# Patient Record
Sex: FEMALE | Race: WHITE | URBAN - NONMETROPOLITAN AREA
[De-identification: names, ages, dates, MRNs, and addresses within clinical notes are randomized per-mention and may not be internally consistent; named-entity substitution may affect disease eponyms.]

---

## 2018-09-14 NOTE — PROGRESS NOTES
MSG LEFT FOR Peggyann Tereza AT [facility], FOR MISSING SURGICAL CHART INFO. NEED LABS, CXR, EKG, ORDERS, AND CLEARANCE.

## 2018-09-15 NOTE — H&P
application of Blue-Emu cream.  All of these have been somewhat  helpful. 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 She does have the diagnosis of cerebral palsy which does at  this time affect her balance and coordination. She uses a cane walking. She uses wheelchair for longer distances due to the way the symptoms have  affected her mobility. She denies any change in urine or stool control at  this time. ALLERGIES:  No known drug allergies. PAST MEDICAL HISTORY:  Includes bronchitis. MEDICATIONS:  Include Flexeril, estradiol, levonorgestrel,  cyclobenzaprine. The patient has stopped vitamins. She is also taking  Tylenol. PAST SURGICAL HISTORY:  Include fallopian tube which was removed in 1999. HOSPITALIZATION:  Pneumonia in 1980 and in [address] [facility]. SOCIAL HISTORY:  Include nonsmoker, currently living with parents at a  private home, working part-time, and currently single. REVIEW OF SYSTEMS:  Include back pain, lower leg radiculopathy. PHYSICAL EXAMINATION:  GENERAL:  The patient is a pleasant female, well developed, well nourished,  and cooperative. She is alert and oriented x3 and currently having  symptoms of pain in the back and in the lower extremities. VITAL SIGNS:  Include blood pressure is 131/89, pulse is 88, BMI is 28.27,  weight is 140 pounds, height is 4 feet 11 inches tall. HEART:  Rate and rhythm are regular. LUNGS:  Clear to auscultation bilaterally. ABDOMEN:  Soft and nontender. EXTREMITIES:  Examination of the bilateral lower extremities demonstrates  no obvious deformities, no open wounds or skin lesion. Pulses are 2+  bilaterally. Calves are soft and nontender. There is no clinical evidence  of DVT. Lower extremities strength exam demonstrates 5/5 strength with  resisted knee extension, plantar flexion, ankle dorsiflexion, and great toe  extension bilaterally.   The patient is sensate throughout the light touch  bilaterally. DIAGNOSTIC DATA:  X-ray imaging, dictated by Dr. Eli Stokes, shows an AP view  with no evidence of lumbar spine scoliosis. On the lateral view, there is  a grade 1-2 isthmic spondylolisthesis of the L4-L5. This listhesis is made  worse in flexion, does not reduce in extension and there is a 25%  anterolisthesis at L4-L5. MRI imaging, dictated by Dr. Eli Stokes, of the lumbar spine shows a chronic  pars defect of the L4 level with a grade 1 anterolisthesis, which is an  isthmic spondylolisthesis of L4-L5. There is also central disk extrusion  and severe narrowing of the canal at the L4-L5 level with a superimposed  facet joint cyst, also neurocompression on the right hand side of the L4-L5  level. ASSESSMENT:  1. Spondylolisthesis of lumbar region. 2.  Low back pain. 3.  Lumbar stenosis with neurogenic claudication. MRSA by PCR is negative. PLAN:  The patient will be undergoing lumbar laminectomy with posterior  surgical fusion and posterior lumbar interbody fusion of L4-L5, local bone  grafting versus ICBG and the patient will be undergoing the surgical  management at [address] [place] [address] on 09/17/2018, and the patient has  been cleared by Dr. Mayela Loera and the patient desired to undergo  surgical management at this time. CONCLUSION:  The patient is having symptoms of back pain to lower extremity  that is worse with ambulation and the patient has failed conservative  management of prednisone, heating pad, naproxen, Tylenol, Flexeril, and the  patient desires surgical management at this time. Dr. Torsten Ayon saw the patient in the office and did explain the surgical  procedure to the patient and the patient understands THE surgical procedure  and the patient is in agreement to undergo surgical procedure at this time.   Also, Dr. Eli Stokes had also spoken to the patient previously and answered all  the questions that the patient had and also did explain the surgical  procedure to the patient which the patient is in agreement to undergo  surgical management at this time. Surgical testing and clearance were  obtained from Dr. Paty Miller and those presurgical testing exams were  reviewed with the patient in the office and the patient is cleared to  undergo surgical management at this time. Informed consent was also  discussed with the patient, which includes risks and benefits of surgery  such as paralysis, hematoma formation, postoperative pain, postoperative  infection, bleeding, dural tear, and spinal fluid leak. All the questions  that the patient had were answered.         MELLISA Zurita MD    D: 09/14/2018 10:48:54       T: 09/14/2018 13:24:49     [email or web address]  Job#: [id number]     Doc#: [id number]    CC:  Primary Care Physician

## 2018-09-17 ENCOUNTER — ANESTHESIA EVENT (OUTPATIENT)
Dept: OPERATING ROOM | Age: 38
DRG: 304 | End: 2018-09-17
Payer: MEDICAID

## 2018-09-17 ENCOUNTER — HOSPITAL ENCOUNTER (INPATIENT)
Age: 38
LOS: 5 days | Discharge: HOME HEALTH | DRG: 304 | End: 2018-09-22
Attending: ORTHOPAEDIC SURGERY | Admitting: ORTHOPAEDIC SURGERY
Payer: MEDICAID

## 2018-09-17 ENCOUNTER — APPOINTMENT (OUTPATIENT)
Dept: GENERAL RADIOLOGY | Age: 38
DRG: 304 | End: 2018-09-17
Attending: ORTHOPAEDIC SURGERY
Payer: MEDICAID

## 2018-09-17 ENCOUNTER — ANESTHESIA (OUTPATIENT)
Dept: OPERATING ROOM | Age: 38
DRG: 304 | End: 2018-09-17
Payer: MEDICAID

## 2018-09-17 VITALS
DIASTOLIC BLOOD PRESSURE: 61 MMHG | RESPIRATION RATE: 17 BRPM | OXYGEN SATURATION: 100 % | SYSTOLIC BLOOD PRESSURE: 98 MMHG | TEMPERATURE: 98.2 F

## 2018-09-17 DIAGNOSIS — M43.16 SPONDYLOLISTHESIS OF LUMBAR REGION: Primary | ICD-10-CM

## 2018-09-17 PROBLEM — R00.0 TACHYCARDIA, UNSPECIFIED: Status: ACTIVE | Noted: 2018-09-17

## 2018-09-17 LAB
ABO: NORMAL
ANION GAP SERPL CALCULATED.3IONS-SCNC: 18 MEQ/L (ref 8–16)
ANTIBODY SCREEN: NORMAL
BUN BLDV-MCNC: 8 MG/DL (ref 7–22)
CALCIUM SERPL-MCNC: 8.2 MG/DL (ref 8.5–10.5)
CHLORIDE BLD-SCNC: 100 MEQ/L (ref 98–111)
CO2: 17 MEQ/L (ref 23–33)
CREAT SERPL-MCNC: 0.5 MG/DL (ref 0.4–1.2)
EKG ATRIAL RATE: 129 BPM
EKG P AXIS: 28 DEGREES
EKG P-R INTERVAL: 122 MS
EKG Q-T INTERVAL: 304 MS
EKG QRS DURATION: 70 MS
EKG QTC CALCULATION (BAZETT): 445 MS
EKG R AXIS: 19 DEGREES
EKG T AXIS: 21 DEGREES
EKG VENTRICULAR RATE: 129 BPM
ERYTHROCYTE [DISTWIDTH] IN BLOOD BY AUTOMATED COUNT: 12.5 % (ref 11.5–14.5)
ERYTHROCYTE [DISTWIDTH] IN BLOOD BY AUTOMATED COUNT: 45.2 FL (ref 35–45)
GFR SERPL CREATININE-BSD FRML MDRD: > 90 ML/MIN/1.73M2
GLUCOSE BLD-MCNC: 213 MG/DL (ref 70–108)
HCT VFR BLD CALC: 38.1 % (ref 37–47)
HEMOGLOBIN: 12.7 GM/DL (ref 12–16)
MCH RBC QN AUTO: 33 PG (ref 26–33)
MCHC RBC AUTO-ENTMCNC: 33.3 GM/DL (ref 32.2–35.5)
MCV RBC AUTO: 99 FL (ref 81–99)
PLATELET # BLD: 291 THOU/MM3 (ref 130–400)
PMV BLD AUTO: 11.9 FL (ref 9.4–12.4)
POTASSIUM SERPL-SCNC: 4.3 MEQ/L (ref 3.5–5.2)
RBC # BLD: 3.85 MILL/MM3 (ref 4.2–5.4)
RH FACTOR: NORMAL
SODIUM BLD-SCNC: 135 MEQ/L (ref 135–145)
WBC # BLD: 18.3 THOU/MM3 (ref 4.8–10.8)

## 2018-09-17 PROCEDURE — 86900 BLOOD TYPING SEROLOGIC ABO: CPT

## 2018-09-17 PROCEDURE — 2580000003 HC RX 258: Performed by: PHYSICIAN ASSISTANT

## 2018-09-17 PROCEDURE — 2580000003 HC RX 258: Performed by: ORTHOPAEDIC SURGERY

## 2018-09-17 PROCEDURE — 3600000015 HC SURGERY LEVEL 5 ADDTL 15MIN: Performed by: ORTHOPAEDIC SURGERY

## 2018-09-17 PROCEDURE — 6360000002 HC RX W HCPCS: Performed by: PHYSICIAN ASSISTANT

## 2018-09-17 PROCEDURE — 72020 X-RAY EXAM OF SPINE 1 VIEW: CPT

## 2018-09-17 PROCEDURE — 7100000000 HC PACU RECOVERY - FIRST 15 MIN: Performed by: ORTHOPAEDIC SURGERY

## 2018-09-17 PROCEDURE — 2700000000 HC OXYGEN THERAPY PER DAY

## 2018-09-17 PROCEDURE — 80048 BASIC METABOLIC PNL TOTAL CA: CPT

## 2018-09-17 PROCEDURE — 86901 BLOOD TYPING SEROLOGIC RH(D): CPT

## 2018-09-17 PROCEDURE — 36415 COLL VENOUS BLD VENIPUNCTURE: CPT

## 2018-09-17 PROCEDURE — 95940 IONM IN OPERATNG ROOM 15 MIN: CPT | Performed by: ORTHOPAEDIC SURGERY

## 2018-09-17 PROCEDURE — 3700000001 HC ADD 15 MINUTES (ANESTHESIA): Performed by: ORTHOPAEDIC SURGERY

## 2018-09-17 PROCEDURE — 1200000000 HC SEMI PRIVATE

## 2018-09-17 PROCEDURE — 2500000003 HC RX 250 WO HCPCS: Performed by: NURSE ANESTHETIST, CERTIFIED REGISTERED

## 2018-09-17 PROCEDURE — 2500000003 HC RX 250 WO HCPCS: Performed by: ORTHOPAEDIC SURGERY

## 2018-09-17 PROCEDURE — 0SG3071 FUSION OF LUMBOSACRAL JOINT WITH AUTOLOGOUS TISSUE SUBSTITUTE, POSTERIOR APPROACH, POSTERIOR COLUMN, OPEN APPROACH: ICD-10-PCS | Performed by: ORTHOPAEDIC SURGERY

## 2018-09-17 PROCEDURE — 6370000000 HC RX 637 (ALT 250 FOR IP): Performed by: ORTHOPAEDIC SURGERY

## 2018-09-17 PROCEDURE — 2580000003 HC RX 258: Performed by: NURSE ANESTHETIST, CERTIFIED REGISTERED

## 2018-09-17 PROCEDURE — 99253 IP/OBS CNSLTJ NEW/EST LOW 45: CPT | Performed by: INTERNAL MEDICINE

## 2018-09-17 PROCEDURE — 94761 N-INVAS EAR/PLS OXIMETRY MLT: CPT

## 2018-09-17 PROCEDURE — 85027 COMPLETE CBC AUTOMATED: CPT

## 2018-09-17 PROCEDURE — 86850 RBC ANTIBODY SCREEN: CPT

## 2018-09-17 PROCEDURE — 2780000010 HC IMPLANT OTHER: Performed by: ORTHOPAEDIC SURGERY

## 2018-09-17 PROCEDURE — 0SB20ZZ EXCISION OF LUMBAR VERTEBRAL DISC, OPEN APPROACH: ICD-10-PCS | Performed by: ORTHOPAEDIC SURGERY

## 2018-09-17 PROCEDURE — 0SG00AJ FUSION OF LUMBAR VERTEBRAL JOINT WITH INTERBODY FUSION DEVICE, POSTERIOR APPROACH, ANTERIOR COLUMN, OPEN APPROACH: ICD-10-PCS | Performed by: ORTHOPAEDIC SURGERY

## 2018-09-17 PROCEDURE — 3700000000 HC ANESTHESIA ATTENDED CARE: Performed by: ORTHOPAEDIC SURGERY

## 2018-09-17 PROCEDURE — 2709999900 HC NON-CHARGEABLE SUPPLY: Performed by: ORTHOPAEDIC SURGERY

## 2018-09-17 PROCEDURE — 72100 X-RAY EXAM L-S SPINE 2/3 VWS: CPT

## 2018-09-17 PROCEDURE — 3600000005 HC SURGERY LEVEL 5 BASE: Performed by: ORTHOPAEDIC SURGERY

## 2018-09-17 PROCEDURE — 2720000010 HC SURG SUPPLY STERILE: Performed by: ORTHOPAEDIC SURGERY

## 2018-09-17 PROCEDURE — 6360000002 HC RX W HCPCS: Performed by: ANESTHESIOLOGY

## 2018-09-17 PROCEDURE — C1713 ANCHOR/SCREW BN/BN,TIS/BN: HCPCS | Performed by: ORTHOPAEDIC SURGERY

## 2018-09-17 PROCEDURE — 7100000001 HC PACU RECOVERY - ADDTL 15 MIN: Performed by: ORTHOPAEDIC SURGERY

## 2018-09-17 PROCEDURE — 6360000002 HC RX W HCPCS: Performed by: ORTHOPAEDIC SURGERY

## 2018-09-17 PROCEDURE — 93005 ELECTROCARDIOGRAM TRACING: CPT | Performed by: INTERNAL MEDICINE

## 2018-09-17 PROCEDURE — 86923 COMPATIBILITY TEST ELECTRIC: CPT

## 2018-09-17 PROCEDURE — 2580000003 HC RX 258: Performed by: INTERNAL MEDICINE

## 2018-09-17 PROCEDURE — 6360000002 HC RX W HCPCS: Performed by: NURSE ANESTHETIST, CERTIFIED REGISTERED

## 2018-09-17 DEVICE — IMPLANTABLE DEVICE
Type: IMPLANTABLE DEVICE | Site: SPINE LUMBAR | Status: FUNCTIONAL
Brand: SET SCREW

## 2018-09-17 DEVICE — ALLOGRAFT STRP DBM PLF GRFT 2.5CM X 5CM: Type: IMPLANTABLE DEVICE | Site: SPINE LUMBAR | Status: FUNCTIONAL

## 2018-09-17 DEVICE — POLYAXIAL SCREW, 6.5 X 45
Type: IMPLANTABLE DEVICE | Site: SPINE LUMBAR | Status: FUNCTIONAL
Brand: POLYAXIAL SCREW, 6.5 X 45

## 2018-09-17 DEVICE — IMPLANTABLE DEVICE: Type: IMPLANTABLE DEVICE | Site: SPINE LUMBAR | Status: FUNCTIONAL

## 2018-09-17 DEVICE — CURVED ROD, 5.5 X 45
Type: IMPLANTABLE DEVICE | Site: SPINE LUMBAR | Status: FUNCTIONAL
Brand: CURVED ROD, 5.5 X 45

## 2018-09-17 RX ORDER — NEOSTIGMINE METHYLSULFATE 1 MG/ML
INJECTION, SOLUTION INTRAVENOUS PRN
Status: DISCONTINUED | OUTPATIENT
Start: 2018-09-17 | End: 2018-09-17 | Stop reason: SDUPTHER

## 2018-09-17 RX ORDER — ROCURONIUM BROMIDE 10 MG/ML
INJECTION, SOLUTION INTRAVENOUS PRN
Status: DISCONTINUED | OUTPATIENT
Start: 2018-09-17 | End: 2018-09-17 | Stop reason: SDUPTHER

## 2018-09-17 RX ORDER — LEVONORGESTREL AND ETHINYL ESTRADIOL 100-20(84)
1 KIT ORAL DAILY
Status: DISCONTINUED | OUTPATIENT
Start: 2018-09-17 | End: 2018-09-22 | Stop reason: HOSPADM

## 2018-09-17 RX ORDER — DEXAMETHASONE SODIUM PHOSPHATE 4 MG/ML
INJECTION, SOLUTION INTRA-ARTICULAR; INTRALESIONAL; INTRAMUSCULAR; INTRAVENOUS; SOFT TISSUE PRN
Status: DISCONTINUED | OUTPATIENT
Start: 2018-09-17 | End: 2018-09-17 | Stop reason: SDUPTHER

## 2018-09-17 RX ORDER — ASCORBIC ACID 500 MG
500 TABLET ORAL DAILY
Status: DISCONTINUED | OUTPATIENT
Start: 2018-09-17 | End: 2018-09-22 | Stop reason: HOSPADM

## 2018-09-17 RX ORDER — ASCORBIC ACID 500 MG
500 TABLET ORAL DAILY
COMMUNITY

## 2018-09-17 RX ORDER — PROPOFOL 10 MG/ML
INJECTION, EMULSION INTRAVENOUS PRN
Status: DISCONTINUED | OUTPATIENT
Start: 2018-09-17 | End: 2018-09-17 | Stop reason: SDUPTHER

## 2018-09-17 RX ORDER — CYCLOBENZAPRINE HCL 10 MG
10 TABLET ORAL 3 TIMES DAILY PRN
Status: DISCONTINUED | OUTPATIENT
Start: 2018-09-17 | End: 2018-09-22 | Stop reason: HOSPADM

## 2018-09-17 RX ORDER — FENTANYL CITRATE 50 UG/ML
INJECTION, SOLUTION INTRAMUSCULAR; INTRAVENOUS PRN
Status: DISCONTINUED | OUTPATIENT
Start: 2018-09-17 | End: 2018-09-17 | Stop reason: SDUPTHER

## 2018-09-17 RX ORDER — M-VIT,TX,IRON,MINS/CALC/FOLIC 27MG-0.4MG
1 TABLET ORAL DAILY
COMMUNITY

## 2018-09-17 RX ORDER — LEVONORGESTREL AND ETHINYL ESTRADIOL 100-20(84)
1 KIT ORAL DAILY
COMMUNITY

## 2018-09-17 RX ORDER — HYDROMORPHONE HCL 110MG/55ML
PATIENT CONTROLLED ANALGESIA SYRINGE INTRAVENOUS PRN
Status: DISCONTINUED | OUTPATIENT
Start: 2018-09-17 | End: 2018-09-17 | Stop reason: SDUPTHER

## 2018-09-17 RX ORDER — 0.9 % SODIUM CHLORIDE 0.9 %
250 INTRAVENOUS SOLUTION INTRAVENOUS ONCE
Status: DISCONTINUED | OUTPATIENT
Start: 2018-09-17 | End: 2018-09-17

## 2018-09-17 RX ORDER — 0.9 % SODIUM CHLORIDE 0.9 %
500 INTRAVENOUS SOLUTION INTRAVENOUS ONCE
Status: COMPLETED | OUTPATIENT
Start: 2018-09-17 | End: 2018-09-17

## 2018-09-17 RX ORDER — CYCLOBENZAPRINE HCL 10 MG
10 TABLET ORAL 2 TIMES DAILY
Status: ON HOLD | COMMUNITY
End: 2018-09-22

## 2018-09-17 RX ORDER — GLYCOPYRROLATE 1 MG/5 ML
SYRINGE (ML) INTRAVENOUS PRN
Status: DISCONTINUED | OUTPATIENT
Start: 2018-09-17 | End: 2018-09-17 | Stop reason: SDUPTHER

## 2018-09-17 RX ORDER — M-VIT,TX,IRON,MINS/CALC/FOLIC 27MG-0.4MG
1 TABLET ORAL DAILY
Status: DISCONTINUED | OUTPATIENT
Start: 2018-09-17 | End: 2018-09-22 | Stop reason: HOSPADM

## 2018-09-17 RX ORDER — LABETALOL HYDROCHLORIDE 5 MG/ML
5 INJECTION, SOLUTION INTRAVENOUS EVERY 10 MIN PRN
Status: DISCONTINUED | OUTPATIENT
Start: 2018-09-17 | End: 2018-09-17

## 2018-09-17 RX ORDER — FENTANYL CITRATE 50 UG/ML
50 INJECTION, SOLUTION INTRAMUSCULAR; INTRAVENOUS EVERY 5 MIN PRN
Status: DISCONTINUED | OUTPATIENT
Start: 2018-09-17 | End: 2018-09-17

## 2018-09-17 RX ORDER — ONDANSETRON 2 MG/ML
4 INJECTION INTRAMUSCULAR; INTRAVENOUS EVERY 6 HOURS PRN
Status: DISCONTINUED | OUTPATIENT
Start: 2018-09-17 | End: 2018-09-22 | Stop reason: HOSPADM

## 2018-09-17 RX ORDER — SODIUM CHLORIDE, SODIUM LACTATE, POTASSIUM CHLORIDE, CALCIUM CHLORIDE 600; 310; 30; 20 MG/100ML; MG/100ML; MG/100ML; MG/100ML
INJECTION, SOLUTION INTRAVENOUS CONTINUOUS PRN
Status: DISCONTINUED | OUTPATIENT
Start: 2018-09-17 | End: 2018-09-17 | Stop reason: SDUPTHER

## 2018-09-17 RX ORDER — ACETAMINOPHEN 325 MG/1
650 TABLET ORAL EVERY 4 HOURS PRN
Status: DISCONTINUED | OUTPATIENT
Start: 2018-09-17 | End: 2018-09-22 | Stop reason: HOSPADM

## 2018-09-17 RX ORDER — OXYCODONE HYDROCHLORIDE AND ACETAMINOPHEN 5; 325 MG/1; MG/1
1 TABLET ORAL EVERY 4 HOURS PRN
Status: DISCONTINUED | OUTPATIENT
Start: 2018-09-17 | End: 2018-09-21

## 2018-09-17 RX ORDER — ONDANSETRON 2 MG/ML
4 INJECTION INTRAMUSCULAR; INTRAVENOUS
Status: DISCONTINUED | OUTPATIENT
Start: 2018-09-17 | End: 2018-09-17

## 2018-09-17 RX ORDER — LIDOCAINE HYDROCHLORIDE 20 MG/ML
INJECTION, SOLUTION INFILTRATION; PERINEURAL PRN
Status: DISCONTINUED | OUTPATIENT
Start: 2018-09-17 | End: 2018-09-17 | Stop reason: SDUPTHER

## 2018-09-17 RX ORDER — OXYCODONE HYDROCHLORIDE AND ACETAMINOPHEN 5; 325 MG/1; MG/1
2 TABLET ORAL EVERY 4 HOURS PRN
Status: DISCONTINUED | OUTPATIENT
Start: 2018-09-17 | End: 2018-09-21

## 2018-09-17 RX ORDER — SODIUM CHLORIDE 9 MG/ML
INJECTION, SOLUTION INTRAVENOUS CONTINUOUS PRN
Status: DISCONTINUED | OUTPATIENT
Start: 2018-09-17 | End: 2018-09-17 | Stop reason: SDUPTHER

## 2018-09-17 RX ORDER — SODIUM CHLORIDE 0.9 % (FLUSH) 0.9 %
10 SYRINGE (ML) INJECTION PRN
Status: DISCONTINUED | OUTPATIENT
Start: 2018-09-17 | End: 2018-09-22 | Stop reason: HOSPADM

## 2018-09-17 RX ORDER — SODIUM CHLORIDE 9 MG/ML
INJECTION, SOLUTION INTRAVENOUS CONTINUOUS
Status: DISCONTINUED | OUTPATIENT
Start: 2018-09-17 | End: 2018-09-20

## 2018-09-17 RX ORDER — SODIUM CHLORIDE 9 MG/ML
INJECTION, SOLUTION INTRAVENOUS CONTINUOUS
Status: DISCONTINUED | OUTPATIENT
Start: 2018-09-17 | End: 2018-09-17

## 2018-09-17 RX ORDER — DOCUSATE SODIUM 100 MG/1
100 CAPSULE, LIQUID FILLED ORAL 2 TIMES DAILY
Status: DISCONTINUED | OUTPATIENT
Start: 2018-09-17 | End: 2018-09-22 | Stop reason: HOSPADM

## 2018-09-17 RX ORDER — HYDRALAZINE HYDROCHLORIDE 20 MG/ML
5 INJECTION INTRAMUSCULAR; INTRAVENOUS EVERY 10 MIN PRN
Status: DISCONTINUED | OUTPATIENT
Start: 2018-09-17 | End: 2018-09-17

## 2018-09-17 RX ORDER — FENTANYL CITRATE 50 UG/ML
25 INJECTION, SOLUTION INTRAMUSCULAR; INTRAVENOUS EVERY 5 MIN PRN
Status: DISCONTINUED | OUTPATIENT
Start: 2018-09-17 | End: 2018-09-17

## 2018-09-17 RX ORDER — SENNOSIDES 8.6 MG
650 CAPSULE ORAL EVERY 8 HOURS PRN
COMMUNITY

## 2018-09-17 RX ORDER — LIDOCAINE HYDROCHLORIDE AND EPINEPHRINE 10; 10 MG/ML; UG/ML
INJECTION, SOLUTION INFILTRATION; PERINEURAL PRN
Status: DISCONTINUED | OUTPATIENT
Start: 2018-09-17 | End: 2018-09-17 | Stop reason: HOSPADM

## 2018-09-17 RX ORDER — MIDAZOLAM HYDROCHLORIDE 1 MG/ML
INJECTION INTRAMUSCULAR; INTRAVENOUS PRN
Status: DISCONTINUED | OUTPATIENT
Start: 2018-09-17 | End: 2018-09-17 | Stop reason: SDUPTHER

## 2018-09-17 RX ORDER — ONDANSETRON 2 MG/ML
INJECTION INTRAMUSCULAR; INTRAVENOUS PRN
Status: DISCONTINUED | OUTPATIENT
Start: 2018-09-17 | End: 2018-09-17 | Stop reason: SDUPTHER

## 2018-09-17 RX ORDER — SODIUM CHLORIDE 0.9 % (FLUSH) 0.9 %
10 SYRINGE (ML) INJECTION EVERY 12 HOURS SCHEDULED
Status: DISCONTINUED | OUTPATIENT
Start: 2018-09-17 | End: 2018-09-22 | Stop reason: HOSPADM

## 2018-09-17 RX ORDER — ACETAMINOPHEN 650 MG/1
650 SUPPOSITORY RECTAL EVERY 4 HOURS PRN
Status: DISCONTINUED | OUTPATIENT
Start: 2018-09-17 | End: 2018-09-22 | Stop reason: HOSPADM

## 2018-09-17 RX ORDER — ESMOLOL HYDROCHLORIDE 10 MG/ML
INJECTION INTRAVENOUS PRN
Status: DISCONTINUED | OUTPATIENT
Start: 2018-09-17 | End: 2018-09-17 | Stop reason: SDUPTHER

## 2018-09-17 RX ORDER — VITAMIN E 268 MG
200 CAPSULE ORAL DAILY
COMMUNITY

## 2018-09-17 RX ORDER — OXYCODONE HCL 10 MG/1
10 TABLET, FILM COATED, EXTENDED RELEASE ORAL EVERY 12 HOURS
Status: DISCONTINUED | OUTPATIENT
Start: 2018-09-17 | End: 2018-09-22 | Stop reason: HOSPADM

## 2018-09-17 RX ORDER — SENNOSIDES 8.6 MG
650 CAPSULE ORAL EVERY 8 HOURS PRN
Status: DISCONTINUED | OUTPATIENT
Start: 2018-09-17 | End: 2018-09-17 | Stop reason: SDUPTHER

## 2018-09-17 RX ADMIN — ONDANSETRON 4 MG: 2 INJECTION INTRAMUSCULAR; INTRAVENOUS at 22:38

## 2018-09-17 RX ADMIN — FENTANYL CITRATE 50 MCG: 50 INJECTION INTRAMUSCULAR; INTRAVENOUS at 14:28

## 2018-09-17 RX ADMIN — SODIUM CHLORIDE: 9 INJECTION, SOLUTION INTRAVENOUS at 13:29

## 2018-09-17 RX ADMIN — SODIUM CHLORIDE: 9 INJECTION, SOLUTION INTRAVENOUS at 12:17

## 2018-09-17 RX ADMIN — Medication 2 G: at 13:31

## 2018-09-17 RX ADMIN — MIDAZOLAM HYDROCHLORIDE 2 MG: 1 INJECTION, SOLUTION INTRAMUSCULAR; INTRAVENOUS at 13:29

## 2018-09-17 RX ADMIN — Medication 0.6 MG: at 15:34

## 2018-09-17 RX ADMIN — HYDROMORPHONE HYDROCHLORIDE 0.5 MG: 1 INJECTION, SOLUTION INTRAMUSCULAR; INTRAVENOUS; SUBCUTANEOUS at 16:35

## 2018-09-17 RX ADMIN — SODIUM CHLORIDE 500 ML: 9 INJECTION, SOLUTION INTRAVENOUS at 21:05

## 2018-09-17 RX ADMIN — FENTANYL CITRATE 50 MCG: 50 INJECTION INTRAMUSCULAR; INTRAVENOUS at 16:25

## 2018-09-17 RX ADMIN — ONDANSETRON HYDROCHLORIDE 4 MG: 4 INJECTION, SOLUTION INTRAMUSCULAR; INTRAVENOUS at 15:34

## 2018-09-17 RX ADMIN — HYDROMORPHONE HYDROCHLORIDE 0.5 MG: 2 INJECTION INTRAMUSCULAR; INTRAVENOUS; SUBCUTANEOUS at 16:15

## 2018-09-17 RX ADMIN — Medication 2 G: at 21:31

## 2018-09-17 RX ADMIN — SODIUM CHLORIDE, POTASSIUM CHLORIDE, SODIUM LACTATE AND CALCIUM CHLORIDE: 600; 310; 30; 20 INJECTION, SOLUTION INTRAVENOUS at 14:20

## 2018-09-17 RX ADMIN — ROCURONIUM BROMIDE 10 MG: 10 INJECTION INTRAVENOUS at 14:22

## 2018-09-17 RX ADMIN — ESMOLOL HYDROCHLORIDE 50 MG: 10 INJECTION, SOLUTION INTRAVENOUS at 13:38

## 2018-09-17 RX ADMIN — HYDROMORPHONE HYDROCHLORIDE 0.5 MG: 2 INJECTION INTRAMUSCULAR; INTRAVENOUS; SUBCUTANEOUS at 15:42

## 2018-09-17 RX ADMIN — DEXAMETHASONE SODIUM PHOSPHATE 10 MG: 4 INJECTION, SOLUTION INTRAMUSCULAR; INTRAVENOUS at 13:31

## 2018-09-17 RX ADMIN — HYDROMORPHONE HYDROCHLORIDE 0.5 MG: 1 INJECTION, SOLUTION INTRAMUSCULAR; INTRAVENOUS; SUBCUTANEOUS at 17:55

## 2018-09-17 RX ADMIN — PROPOFOL 150 MG: 10 INJECTION, EMULSION INTRAVENOUS at 13:29

## 2018-09-17 RX ADMIN — FENTANYL CITRATE 100 MCG: 50 INJECTION INTRAMUSCULAR; INTRAVENOUS at 14:06

## 2018-09-17 RX ADMIN — NEOSTIGMINE METHYLSULFATE 3 MG: 1 INJECTION, SOLUTION INTRAVENOUS at 15:34

## 2018-09-17 RX ADMIN — FENTANYL CITRATE 50 MCG: 50 INJECTION INTRAMUSCULAR; INTRAVENOUS at 16:30

## 2018-09-17 RX ADMIN — OXYCODONE HYDROCHLORIDE AND ACETAMINOPHEN 1 TABLET: 5; 325 TABLET ORAL at 22:38

## 2018-09-17 RX ADMIN — HYDROMORPHONE HYDROCHLORIDE 0.5 MG: 1 INJECTION, SOLUTION INTRAMUSCULAR; INTRAVENOUS; SUBCUTANEOUS at 16:40

## 2018-09-17 RX ADMIN — ESMOLOL HYDROCHLORIDE 50 MG: 10 INJECTION, SOLUTION INTRAVENOUS at 14:06

## 2018-09-17 RX ADMIN — PHENYLEPHRINE HYDROCHLORIDE 200 MCG: 10 INJECTION INTRAVENOUS at 13:43

## 2018-09-17 RX ADMIN — LIDOCAINE HYDROCHLORIDE 100 MG: 20 INJECTION, SOLUTION INFILTRATION; PERINEURAL at 13:29

## 2018-09-17 RX ADMIN — PHENYLEPHRINE HYDROCHLORIDE 50 MCG: 10 INJECTION INTRAVENOUS at 15:18

## 2018-09-17 RX ADMIN — ROCURONIUM BROMIDE 50 MG: 10 INJECTION INTRAVENOUS at 13:29

## 2018-09-17 RX ADMIN — FENTANYL CITRATE 100 MCG: 50 INJECTION INTRAMUSCULAR; INTRAVENOUS at 13:29

## 2018-09-17 ASSESSMENT — PULMONARY FUNCTION TESTS
PIF_VALUE: 19
PIF_VALUE: 19
PIF_VALUE: 3
PIF_VALUE: 2
PIF_VALUE: 19
PIF_VALUE: 17
PIF_VALUE: 2
PIF_VALUE: 3
PIF_VALUE: 19
PIF_VALUE: 20
PIF_VALUE: 1
PIF_VALUE: 4
PIF_VALUE: 2
PIF_VALUE: 19
PIF_VALUE: 2
PIF_VALUE: 19
PIF_VALUE: 17
PIF_VALUE: 19
PIF_VALUE: 19
PIF_VALUE: 1
PIF_VALUE: 19
PIF_VALUE: 18
PIF_VALUE: 3
PIF_VALUE: 17
PIF_VALUE: 19
PIF_VALUE: 18
PIF_VALUE: 19
PIF_VALUE: 19
PIF_VALUE: 15
PIF_VALUE: 19
PIF_VALUE: 1
PIF_VALUE: 16
PIF_VALUE: 19
PIF_VALUE: 19
PIF_VALUE: 3
PIF_VALUE: 19
PIF_VALUE: 20
PIF_VALUE: 19
PIF_VALUE: 19
PIF_VALUE: 20
PIF_VALUE: 20
PIF_VALUE: 19
PIF_VALUE: 19
PIF_VALUE: 17
PIF_VALUE: 17
PIF_VALUE: 19
PIF_VALUE: 18
PIF_VALUE: 19
PIF_VALUE: 20
PIF_VALUE: 18
PIF_VALUE: 19
PIF_VALUE: 19
PIF_VALUE: 3
PIF_VALUE: 20
PIF_VALUE: 4
PIF_VALUE: 19
PIF_VALUE: 19
PIF_VALUE: 4
PIF_VALUE: 15
PIF_VALUE: 19
PIF_VALUE: 18
PIF_VALUE: 19
PIF_VALUE: 18
PIF_VALUE: 17
PIF_VALUE: 20
PIF_VALUE: 19
PIF_VALUE: 3
PIF_VALUE: 19
PIF_VALUE: 3
PIF_VALUE: 19
PIF_VALUE: 19
PIF_VALUE: 3
PIF_VALUE: 19
PIF_VALUE: 19
PIF_VALUE: 18
PIF_VALUE: 3
PIF_VALUE: 18
PIF_VALUE: 19
PIF_VALUE: 19
PIF_VALUE: 4
PIF_VALUE: 19
PIF_VALUE: 19
PIF_VALUE: 20
PIF_VALUE: 3
PIF_VALUE: 19
PIF_VALUE: 20
PIF_VALUE: 19
PIF_VALUE: 17
PIF_VALUE: 19
PIF_VALUE: 3
PIF_VALUE: 19
PIF_VALUE: 18
PIF_VALUE: 19
PIF_VALUE: 20
PIF_VALUE: 19
PIF_VALUE: 17
PIF_VALUE: 18
PIF_VALUE: 19
PIF_VALUE: 16
PIF_VALUE: 1
PIF_VALUE: 20
PIF_VALUE: 17
PIF_VALUE: 19
PIF_VALUE: 20
PIF_VALUE: 19
PIF_VALUE: 18
PIF_VALUE: 19
PIF_VALUE: 21
PIF_VALUE: 1
PIF_VALUE: 17
PIF_VALUE: 2
PIF_VALUE: 2
PIF_VALUE: 18
PIF_VALUE: 19
PIF_VALUE: 18
PIF_VALUE: 20
PIF_VALUE: 20
PIF_VALUE: 4
PIF_VALUE: 19
PIF_VALUE: 3
PIF_VALUE: 19
PIF_VALUE: 19
PIF_VALUE: 20
PIF_VALUE: 19
PIF_VALUE: 19
PIF_VALUE: 18
PIF_VALUE: 19
PIF_VALUE: 4
PIF_VALUE: 19
PIF_VALUE: 18
PIF_VALUE: 16
PIF_VALUE: 19
PIF_VALUE: 3
PIF_VALUE: 1
PIF_VALUE: 16
PIF_VALUE: 3
PIF_VALUE: 19
PIF_VALUE: 19
PIF_VALUE: 15
PIF_VALUE: 3
PIF_VALUE: 15
PIF_VALUE: 18
PIF_VALUE: 19
PIF_VALUE: 20
PIF_VALUE: 18
PIF_VALUE: 18
PIF_VALUE: 19
PIF_VALUE: 20
PIF_VALUE: 3
PIF_VALUE: 1
PIF_VALUE: 19
PIF_VALUE: 4
PIF_VALUE: 19
PIF_VALUE: 18
PIF_VALUE: 19

## 2018-09-17 ASSESSMENT — PAIN SCALES - GENERAL
PAINLEVEL_OUTOF10: 10
PAINLEVEL_OUTOF10: 5
PAINLEVEL_OUTOF10: 6
PAINLEVEL_OUTOF10: 10

## 2018-09-17 ASSESSMENT — PAIN DESCRIPTION - LOCATION: LOCATION: BACK

## 2018-09-17 ASSESSMENT — PAIN DESCRIPTION - PAIN TYPE: TYPE: SURGICAL PAIN

## 2018-09-17 ASSESSMENT — PAIN DESCRIPTION - DESCRIPTORS: DESCRIPTORS: BURNING

## 2018-09-17 ASSESSMENT — PAIN - FUNCTIONAL ASSESSMENT: PAIN_FUNCTIONAL_ASSESSMENT: 0-10

## 2018-09-17 NOTE — PROGRESS NOTES
[phone] Pt arouses to name on arrival to PACU , O2 3L NC applied HOB elevated , both arms are very stiff and contracted to the body ,   1625 pt awake and talking , pt encouraged to relax arms , with frankie pressure arms where straitened out , pt wants to curl arms back up pt crying and states it hurts a lot medicated with fentanyl 50 mcg IV , pt crying and states she wants her Mom  1630 continues to say hurts a lot medicated with fentanyl 50 mcg IV   1635 no change with pt medicated with dilaudid 0.5 mg IV   1640 unchanged medicated with dilaudid 0.5 mg IV   1655 eyes closed resp easy   1710 resting resp easy   1715 awakens on own states she wants her Mom and starts to cry  1735 continues to rest and cry on and off , states it hurts but wants to go see her Mom  1740 meets criteria for discharge , transferred to [facility] Namrata Fagan in room waiting

## 2018-09-17 NOTE — BRIEF OP NOTE
Brief Postoperative Note  ______________________________________________________________    Patient: Katherin Welch  YOB: 1980  MRN: [id number]  Date of Procedure: 9/17/2018    Pre-Op Diagnosis: SPONDYLOLISTHESIS OF LUMBAR REGION, PAIN    Post-Op Diagnosis: Same       Procedure(s):  LUMBAR LAMINECTOMY WITH PSF/PLIF L4-5 WITH CAPTIVA,   DBM LOCAL BONE GRAFTING VS ICBG    Anesthesia: General    Surgeon(s):  Apolonia Voss MD    Staff:  First Assistant: Cirilo Benavides MA  Scrub Person First: Sil Parker  Scrub Person Second: Gerber Gonzalez  Physician Assistant: Evelio Herrera PA-C     Estimated Blood Loss: 400 (units unknown) mL    Complications: None    Specimens:   * No specimens in log *    Implants:  * No implants in log *      Drains:   Closed/Suction Drain Posterior Back Accordion 10 Turk[place] (Active)       Urethral Catheter Latex 16 fr (Active)       Findings: same    Apolonia Voss MD  Date: 9/17/2018  Time: 3:38 PM

## 2018-09-17 NOTE — ANESTHESIA PRE PROCEDURE
Department of Anesthesiology  Preprocedure Note       Name:  Hiwot Michelle   Age:  45 y.o.  :  1980                                          MRN:  [id number]         Date:  2018      Surgeon: Fozia Phan):  Jason Gan MD    Procedure: Procedure(s):  LUMBAR LAMINECTOMY WITH PSF/PLIF L4-5 WITH CAPTIVA,   DBM LOCAL BONE GRAFTING VS ICBG    Medications prior to admission:   Prior to Admission medications    Medication Sig Start Date End Date Taking? Authorizing Provider   Multiple Vitamins-Minerals (THERAPEUTIC MULTIVITAMIN-MINERALS) tablet Take 1 tablet by mouth daily   Yes Historical Provider, MD   vitamin C (ASCORBIC ACID) 500 MG tablet Take 500 mg by mouth daily   Yes Historical Provider, MD   vitamin E 400 UNIT capsule Take 200 Units by mouth daily   Yes Historical Provider, MD   Levonorgest-Eth Estrad 91-Day 0.1-0.02 & 0.01 MG TABS Take 1 tablet by mouth daily   Yes Historical Provider, MD   cyclobenzaprine (FLEXERIL) 10 MG tablet Take 10 mg by mouth 2 times daily Mother states patient takes regularly at 0800 and 1600.    Yes Historical Provider, MD   acetaminophen (TYLENOL) 650 MG extended release tablet Take 650 mg by mouth every 8 hours as needed for Pain Arthritis pain reliever   Yes Historical Provider, MD   Liniments (BLUE-EMU SUPER STRENGTH) CREA Apply topically Muscle rub   Yes Historical Provider, MD       Current medications:    Current Facility-Administered Medications   Medication Dose Route Frequency Provider Last Rate Last Dose    0.9 % sodium chloride infusion   Intravenous Continuous Lanre Odell PA-C        ceFAZolin (ANCEF) 2 g in dextrose 5 % 50 mL IVPB  2 g Intravenous On Call to OR Lanre Odell PA-C        0.9 % sodium chloride bolus  250 mL Intravenous Once Lanre Odell PA-C           Allergies:  No Known Allergies    Problem List:    Patient Active Problem List   Diagnosis Code    Spondylolisthesis of lumbar region M43.16       Past Medical History:        Diagnosis Date  History of cerebral palsy        Past Surgical History:        Procedure Laterality Date    OTHER SURGICAL HISTORY      falopean tubes removed  20 yrs ago       Social History:    Social History   Substance Use Topics    Smoking status: Never Smoker    Smokeless tobacco: Never Used    Alcohol use No                                Counseling given: Not Answered      Vital Signs (Current):   Vitals:    09/17/18 1115 09/17/18 1135   BP:  126/87   Pulse:  90   Resp:  16   Temp:  98.9 °F (37.2 °C)   TempSrc:  Temporal   SpO2:  97%   Weight: 140 lb (63.5 kg) 142 lb 3.2 oz (64.5 kg)   Height: 4' 11\" (1.499 m) 4' 11\" (1.499 m)                                              BP Readings from Last 3 Encounters:   09/17/18 126/87       NPO Status: Time of last liquid consumption: 2000                        Time of last solid consumption: 2000                        Date of last liquid consumption: 09/16/18                        Date of last solid food consumption: 09/16/18    BMI:   Wt Readings from Last 3 Encounters:   09/17/18 142 lb 3.2 oz (64.5 kg)   09/11/18 140 lb (63.5 kg)     Body mass index is 28.72 kg/m². CBC: No results found for: WBC, RBC, HGB, HCT, MCV, RDW, PLT    CMP: No results found for: NA, K, CL, CO2, BUN, CREATININE, GFRAA, AGRATIO, LABGLOM, GLUCOSE, PROT, CALCIUM, BILITOT, ALKPHOS, AST, ALT    POC Tests: No results for input(s): POCGLU, POCNA, POCK, POCCL, POCBUN, POCHEMO, POCHCT in the last 72 hours.     Coags: No results found for: PROTIME, INR, APTT    HCG (If Applicable): No results found for: PREGTESTUR, PREGSERUM, HCG, HCGQUANT     ABGs: No results found for: PHART, PO2ART, FEW5MTZ, LRI1CEY, BEART, Z9RYNPMC     Type & Screen (If Applicable):  No results found for: LABABO, LABRH    Anesthesia Evaluation   no history of anesthetic complications:   Airway: Mallampati: II  TM distance: >3 FB   Neck ROM: full  Mouth opening: > = 3 FB Dental:          Pulmonary:Negative Pulmonary ROS and normal exam              Patient did not smoke on day of surgery. Cardiovascular:  Exercise tolerance: good (>4 METS),                     Neuro/Psych:   Negative Neuro/Psych ROS              GI/Hepatic/Renal: Neg GI/Hepatic/Renal ROS            Endo/Other: Negative Endo/Other ROS             Pt had no PAT visit       Abdominal:           Vascular: negative vascular ROS. Anesthesia Plan      general     ASA 2       Induction: intravenous. MIPS: Postoperative opioids intended and Prophylactic antiemetics administered. Anesthetic plan and risks discussed with patient. Plan discussed with CRNA.                   Marcie Lucero MD   9/17/2018

## 2018-09-17 NOTE — ANESTHESIA POSTPROCEDURE EVALUATION
Department of Anesthesiology  Postprocedure Note    Patient: Kenia Strong  MRN: [id number]  YOB: 1980  Date of evaluation: 9/17/2018  Time:  6:55 PM     Procedure Summary     Date:  09/17/18 Room / Location:  [facility] SAVITA Fagan    Anesthesia Start:  2920 Anesthesia Stop:  1628    Procedure:  LUMBAR LAMINECTOMY WITH PSF/PLIF L4-5 WITH CAPTIVA,   DBM LOCAL BONE GRAFTING (N/A Spine Lumbar) Diagnosis:  (SPONDYLOLISTHESIS OF LUMBAR REGION, PAIN)    Surgeon:  Ron Ball MD Responsible Provider:  Marcie Lucero MD    Anesthesia Type:  general ASA Status:  2          Anesthesia Type: general    Alem Phase I: Alem Score: 9    Alem Phase II:      Last vitals: Reviewed and per EMR flowsheets. Anesthesia Post Evaluation    Patient location during evaluation: PACU  Patient participation: complete - patient participated  Level of consciousness: awake and alert  Airway patency: patent  Nausea & Vomiting: no nausea and no vomiting  Complications: no  Cardiovascular status: hemodynamically stable  Respiratory status: acceptable  Hydration status: euvolemic      [address]  POST-ANESTHESIA NOTE       Name:  Kenia Strong                                         Age:  45 y.o.   MRN:  [id number]      Last Vitals:  BP (!) 144/71   Pulse 155   Temp 96 °F (35.6 °C) (Oral)   Resp 20   Ht 4' 11\" (1.499 m)   Wt 142 lb 3.2 oz (64.5 kg)   LMP 06/25/2018   SpO2 97%   BMI 28.72 kg/m²   Patient Vitals for the past 4 hrs:   BP Temp Temp src Pulse Resp SpO2   09/17/18 1820 (!) 144/71 - - 155 20 97 %   09/17/18 1805 128/76 - - 142 20 98 %   09/17/18 1750 (!) 112/58 96 °F (35.6 °C) Oral 149 24 97 %   09/17/18 1730 (!) 122/57 - - 130 12 99 %   09/17/18 1725 130/65 - - 143 17 98 %   09/17/18 1720 (!) 112/55 - - 145 22 96 %   09/17/18 1715 (!) 117/55 - - 132 10 96 %   09/17/18 1710 130/75 - - 141 29 93 %   09/17/18 1705 128/60 - - 123 20 93 %   09/17/18 1700 118/82 - - 134 22 97 %   09/17/18 1655 (!)

## 2018-09-17 NOTE — CONSULTS
Onset    Other Father         faviola chen       Diet:  DIET CLEAR LIQUID;    REVIEW OF SYSTEMS:   Pertinent positives as noted in the HPI. All other systems reviewed and negative. PHYSICAL EXAM:  BP (!) 144/71   Pulse 155   Temp 96 °F (35.6 °C) (Oral)   Resp 20   Ht 4' 11\" (1.499 m)   Wt 142 lb 3.2 oz (64.5 kg)   LMP 06/25/2018   SpO2 97%   BMI 28.72 kg/m²   General appearance:  No apparent distress, appears stated age and cooperative. HEENT:  Normal cephalic, atraumatic without obvious deformity. Pupils equal, round, and reactive to light. Extra ocular muscles intact. Conjunctivae/corneas clear. Neck: Supple, with full range of motion. no jugular venous distention. Trachea midline. no carotid bruits  Respiratory:  Normal respiratory effort. Clear to auscultation, bilaterally without Rales/Wheezes/Rhonchi. Breath sounds equal bilaterally  Cardiovascular:  Regular rate and rhythm with normal S1/S2 without murmurs, rubs or gallops. PMI non displaced  Abdomen: Soft, non-tender, non-distended with normal bowel sounds. No guarding, rebound. Musculoskeletal:  No clubbing, cyanosis or edema bilaterally. No palp cords  Skin: Skin color, texture, turgor normal.  No rashes or lesions, or suspicious lesions. Neurologic:   Cranial nerves: II-XII intact, grossly non-focal.  Psychiatric:  Alert and oriented, thought content appropriate, normal insight  Capillary Refill: Brisk,< 2 seconds   Peripheral Pulses: +2 palpable, equal bilaterally upper and lower extremities  Lymphatics: no lymphadenopathy    Labs:     No results for input(s): WBC, HGB, HCT, PLT in the last 72 hours. No results for input(s): NA, K, CL, CO2, BUN, CREATININE, CALCIUM, PHOS in the last 72 hours. Invalid input(s): MAGNES  No results for input(s): AST, ALT, BILIDIR, BILITOT, ALKPHOS in the last 72 hours. No results for input(s): INR in the last 72 hours. No results for input(s): Roberts Love in the last 72 hours.     Urinalysis: No results found for: Gurdeep Early, BACTERIA, [address] [facility], BLOODU, [address], [facility]    Radiology: I have reviewed the radiology reports with the following interpretation:     XR Lumbar Spine 1 VW   Final Result      Surgical changes as detailed above. Final report electronically signed by Dr. Mayra Knott on 9/17/2018 3:36 PM      XR Lumbar Spine 1 VW   Final Result   Intraoperative  appearance of the lumbar spine. **This report has been created using voice recognition software. It may contain minor errors which are inherent in voice recognition technology. **      Final report electronically signed by Dr. Ana Paula Hughes on 9/17/2018 2:32 PM      XR LUMBAR SPINE (2-3 VIEWS)    (Results Pending)          EKG:  I have reviewed the EKG with the following interpretation:    Pending      DVT prophylaxis: [] Lovenox                                 [x] SCDs                                 [] SQ Heparin                                 [] Encourage ambulation           [] Already on Anticoagulation      Code Status: Full Code    PT/OT Eval Status: Active and ongoing      ASSESSMENT:    Active Hospital Problems    Diagnosis Date Noted    Spondylolisthesis of lumbar region [M43.16] 09/17/2018    Tachycardia, unspecified [R00.0] 09/17/2018       PLAN:    1. ekg  2. Cbc  3. Bmp  4. Fluid bolus         Thank you for the consultation.     Electronically signed by Aimee Euceda DO on 9/17/2018 at 7:19 PM

## 2018-09-17 NOTE — PROCEDURES
EKG was handed to [facility], [facility].

## 2018-09-17 NOTE — PROGRESS NOTES
Pre-op sign and symptoms: left leg weakness with numbness and tingling, lower back pain, push pulls strong, left leg weaker than right, grasps equal and strong.

## 2018-09-18 LAB
AMPHETAMINE+METHAMPHETAMINE URINE SCREEN: NEGATIVE
ANION GAP SERPL CALCULATED.3IONS-SCNC: 17 MEQ/L (ref 8–16)
BARBITURATE QUANTITATIVE URINE: NEGATIVE
BASOPHILS # BLD: 0.1 %
BASOPHILS ABSOLUTE: 0 THOU/MM3 (ref 0–0.1)
BENZODIAZEPINE QUANTITATIVE URINE: NEGATIVE
BUN BLDV-MCNC: 5 MG/DL (ref 7–22)
CALCIUM SERPL-MCNC: 8.6 MG/DL (ref 8.5–10.5)
CANNABINOID QUANTITATIVE URINE: NEGATIVE
CHLORIDE BLD-SCNC: 100 MEQ/L (ref 98–111)
CO2: 20 MEQ/L (ref 23–33)
COCAINE METABOLITE QUANTITATIVE URINE: NEGATIVE
CREAT SERPL-MCNC: 0.6 MG/DL (ref 0.4–1.2)
EOSINOPHIL # BLD: 0 %
EOSINOPHILS ABSOLUTE: 0 THOU/MM3 (ref 0–0.4)
ERYTHROCYTE [DISTWIDTH] IN BLOOD BY AUTOMATED COUNT: 12.4 % (ref 11.5–14.5)
ERYTHROCYTE [DISTWIDTH] IN BLOOD BY AUTOMATED COUNT: 43.7 FL (ref 35–45)
GFR SERPL CREATININE-BSD FRML MDRD: > 90 ML/MIN/1.73M2
GLUCOSE BLD-MCNC: 122 MG/DL (ref 70–108)
HCT VFR BLD CALC: 31.4 % (ref 37–47)
HEMOGLOBIN: 10.9 GM/DL (ref 12–16)
IMMATURE GRANS (ABS): 0.12 THOU/MM3 (ref 0–0.07)
IMMATURE GRANULOCYTES: 0.9 %
LYMPHOCYTES # BLD: 6.8 %
LYMPHOCYTES ABSOLUTE: 1 THOU/MM3 (ref 1–4.8)
MCH RBC QN AUTO: 33.6 PG (ref 26–33)
MCHC RBC AUTO-ENTMCNC: 34.7 GM/DL (ref 32.2–35.5)
MCV RBC AUTO: 96.9 FL (ref 81–99)
MONOCYTES # BLD: 3 %
MONOCYTES ABSOLUTE: 0.4 THOU/MM3 (ref 0.4–1.3)
NUCLEATED RED BLOOD CELLS: 0 /100 WBC
OPIATES, URINE: NEGATIVE
OXYCODONE: POSITIVE
PHENCYCLIDINE QUANTITATIVE URINE: NEGATIVE
PLATELET # BLD: 252 THOU/MM3 (ref 130–400)
PMV BLD AUTO: 12 FL (ref 9.4–12.4)
POTASSIUM SERPL-SCNC: 4.4 MEQ/L (ref 3.5–5.2)
RBC # BLD: 3.24 MILL/MM3 (ref 4.2–5.4)
SEG NEUTROPHILS: 89.2 %
SEGMENTED NEUTROPHILS ABSOLUTE COUNT: 12.5 THOU/MM3 (ref 1.8–7.7)
SODIUM BLD-SCNC: 137 MEQ/L (ref 135–145)
WBC # BLD: 14 THOU/MM3 (ref 4.8–10.8)

## 2018-09-18 PROCEDURE — 80307 DRUG TEST PRSMV CHEM ANLYZR: CPT

## 2018-09-18 PROCEDURE — 36415 COLL VENOUS BLD VENIPUNCTURE: CPT

## 2018-09-18 PROCEDURE — 85025 COMPLETE CBC W/AUTO DIFF WBC: CPT

## 2018-09-18 PROCEDURE — 1200000000 HC SEMI PRIVATE

## 2018-09-18 PROCEDURE — 93010 ELECTROCARDIOGRAM REPORT: CPT | Performed by: INTERNAL MEDICINE

## 2018-09-18 PROCEDURE — 97535 SELF CARE MNGMENT TRAINING: CPT

## 2018-09-18 PROCEDURE — G8978 MOBILITY CURRENT STATUS: HCPCS

## 2018-09-18 PROCEDURE — 97166 OT EVAL MOD COMPLEX 45 MIN: CPT

## 2018-09-18 PROCEDURE — 6370000000 HC RX 637 (ALT 250 FOR IP): Performed by: ORTHOPAEDIC SURGERY

## 2018-09-18 PROCEDURE — 99232 SBSQ HOSP IP/OBS MODERATE 35: CPT | Performed by: INTERNAL MEDICINE

## 2018-09-18 PROCEDURE — 94761 N-INVAS EAR/PLS OXIMETRY MLT: CPT

## 2018-09-18 PROCEDURE — G8979 MOBILITY GOAL STATUS: HCPCS

## 2018-09-18 PROCEDURE — 2580000003 HC RX 258: Performed by: ORTHOPAEDIC SURGERY

## 2018-09-18 PROCEDURE — 6360000002 HC RX W HCPCS: Performed by: ORTHOPAEDIC SURGERY

## 2018-09-18 PROCEDURE — 97110 THERAPEUTIC EXERCISES: CPT

## 2018-09-18 PROCEDURE — G8988 SELF CARE GOAL STATUS: HCPCS

## 2018-09-18 PROCEDURE — 97162 PT EVAL MOD COMPLEX 30 MIN: CPT

## 2018-09-18 PROCEDURE — G8987 SELF CARE CURRENT STATUS: HCPCS

## 2018-09-18 PROCEDURE — 97530 THERAPEUTIC ACTIVITIES: CPT

## 2018-09-18 PROCEDURE — 6370000000 HC RX 637 (ALT 250 FOR IP): Performed by: INTERNAL MEDICINE

## 2018-09-18 PROCEDURE — 80048 BASIC METABOLIC PNL TOTAL CA: CPT

## 2018-09-18 RX ADMIN — OXYCODONE HYDROCHLORIDE 10 MG: 10 TABLET, FILM COATED, EXTENDED RELEASE ORAL at 11:02

## 2018-09-18 RX ADMIN — LEVONORGESTREL AND ETHINYL ESTRADIOL 1 TABLET: KIT at 09:40

## 2018-09-18 RX ADMIN — OXYCODONE HYDROCHLORIDE AND ACETAMINOPHEN 2 TABLET: 5; 325 TABLET ORAL at 15:01

## 2018-09-18 RX ADMIN — VITAMIN E CAP 100 UNIT 200 UNITS: 100 CAP at 09:43

## 2018-09-18 RX ADMIN — OXYCODONE HYDROCHLORIDE AND ACETAMINOPHEN 2 TABLET: 5; 325 TABLET ORAL at 08:05

## 2018-09-18 RX ADMIN — METOPROLOL TARTRATE 25 MG: 25 TABLET ORAL at 22:03

## 2018-09-18 RX ADMIN — Medication 2 G: at 04:51

## 2018-09-18 RX ADMIN — Medication 10 ML: at 22:04

## 2018-09-18 RX ADMIN — DOCUSATE SODIUM 100 MG: 100 CAPSULE, LIQUID FILLED ORAL at 09:43

## 2018-09-18 RX ADMIN — SODIUM CHLORIDE: 9 INJECTION, SOLUTION INTRAVENOUS at 04:51

## 2018-09-18 RX ADMIN — MULTIPLE VITAMINS W/ MINERALS TAB 1 TABLET: TAB at 09:43

## 2018-09-18 RX ADMIN — CYCLOBENZAPRINE 10 MG: 10 TABLET, FILM COATED ORAL at 11:02

## 2018-09-18 RX ADMIN — OXYCODONE HYDROCHLORIDE 10 MG: 10 TABLET, FILM COATED, EXTENDED RELEASE ORAL at 22:04

## 2018-09-18 RX ADMIN — DOCUSATE SODIUM 100 MG: 100 CAPSULE, LIQUID FILLED ORAL at 22:03

## 2018-09-18 RX ADMIN — OXYCODONE HYDROCHLORIDE 10 MG: 10 TABLET, FILM COATED, EXTENDED RELEASE ORAL at 00:33

## 2018-09-18 RX ADMIN — Medication 500 MG: at 09:43

## 2018-09-18 ASSESSMENT — PAIN SCALES - GENERAL
PAINLEVEL_OUTOF10: 5
PAINLEVEL_OUTOF10: 7
PAINLEVEL_OUTOF10: 5
PAINLEVEL_OUTOF10: 8
PAINLEVEL_OUTOF10: 5
PAINLEVEL_OUTOF10: 7
PAINLEVEL_OUTOF10: 7
PAINLEVEL_OUTOF10: 5

## 2018-09-18 ASSESSMENT — PAIN DESCRIPTION - LOCATION
LOCATION: BACK

## 2018-09-18 ASSESSMENT — PAIN DESCRIPTION - ONSET: ONSET: ON-GOING

## 2018-09-18 ASSESSMENT — PAIN DESCRIPTION - ORIENTATION: ORIENTATION: LOWER;LEFT

## 2018-09-18 ASSESSMENT — PAIN DESCRIPTION - PAIN TYPE
TYPE: SURGICAL PAIN
TYPE: SURGICAL PAIN
TYPE: SURGICAL PAIN;ACUTE PAIN

## 2018-09-18 ASSESSMENT — PAIN DESCRIPTION - FREQUENCY: FREQUENCY: INTERMITTENT

## 2018-09-18 ASSESSMENT — PAIN DESCRIPTION - DESCRIPTORS: DESCRIPTORS: ACHING

## 2018-09-18 NOTE — PROGRESS NOTES
Department of Orthopedic Surgery  Spine Service  Chayo Guevara PA-C Progress Note        Subjective:  Pt lying in bed feeling well now, had some significant back pain yesterday. Still sore but better managed. Currently states that leg radiculopathy doing better. Has been tachycardic overnight. Vitals  VITALS:  /64   Pulse 114   Temp 97.9 °F (36.6 °C) (Oral)   Resp 16   Ht 4' 11\" (1.499 m)   Wt 142 lb 3.2 oz (64.5 kg)   LMP 06/25/2018   SpO2 94%   BMI 28.72 kg/m²   24HR INTAKE/OUTPUT:    Intake/Output Summary (Last 24 hours) at 09/18/18 0700  Last data filed at 09/18/18 0457   Gross per 24 hour   Intake           3684.4 ml   Output             3000 ml   Net            684.4 ml     URINARY CATHETER OUTPUT (Ryan):  Urethral Catheter Latex 16 fr-Output (mL): 1750 mL  DRAIN/TUBE OUTPUT:  Closed/Suction Drain Posterior Back Accordion 10 [place]-Output (ml): 90 ml  Closed/Suction Drain Posterior Back Accordion 10 [place]-Output (ml): 60 ml      PHYSICAL EXAM:    Orientation:  alert and oriented to person, place and time    Incision:  dressing in place, clean, dry, intact      Lower Extremity Motor :  quadriceps, extensor hallucis longus, dorsiflexion, plantarflexion 5/5 bilaterally  Lower Extremity Sensory:  Intact L1-S1    Flatus:  negative    ABNORMAL EXAM FINDINGS:  none    LABS:    HgB:    Lab Results   Component Value Date    HGB 10.9 09/18/2018         ASSESSMENT AND PLAN:    Post operative day 1     1:  Monitor labs and drain output  2:  Activity Level:  Up OOB with therapy   3:  Pain Control:  Controlled currently, maintain pain regimen  4:  Discharge Planning:  Planning d/c to home once ambulation improves, drains decrease, and bowel function returns  5:  Remove ryan once able to get to the bathroom with assistance.   6:  Hospitalist consult for tachycardia, IV fluids

## 2018-09-18 NOTE — PROGRESS NOTES
however no LOB                                Activity Tolerance:  Activity Tolerance: Patient Tolerated treatment well, Patient limited by fatigue  Activity Tolerance: Treatment Initiated: OT eval completed, see assessment above. Pt with tachycardia overnight, HR to not exceed 150 BPM per RN, HR did not exceed 109 during session. Tolerated session fairly well, limited mobility this date. Assessment:  Assessment: Pt would benefit from cont'd skilled OT intervention for maximizing pt safety and independence with self care tasks and functional mobilit  Performance deficits / Impairments: Decreased functional mobility , Decreased ADL status, Decreased strength, Decreased safe awareness, Decreased balance, Decreased endurance  Prognosis: Good  Discharge Recommendations: Continue to assess pending progress, Patient would benefit from continued therapy after discharge    Clinical Decision Making: Clinical Decision making was of Moderate Complexity as the result of analysis of data from a detailed assessment, a consideration of several treatment options, the presence of comorbidities affecting the plan of care and the need for minimal to moderate modifications or assistance required to complete the evaluation. Patient Education:  Barriers to Learning: Role of OT, POC and goals, safety, use of tub transfer bench, back precuations    Equipment Recommendations: Other: discussed role of tub transfer bench; family reported good understanding    Safety:  Safety Devices in place: Yes  Type of devices: All fall risk precautions in place, Left in bed, Nurse notified, Call light within reach, Gait belt, Patient at risk for falls (parents present)    Plan:  Times per week: 6x  Current Treatment Recommendations: Balance Training, Functional Mobility Training, Endurance Training, Safety Education & Training, Self-Care / ADL, Strengthening, Patient/Caregiver Education & Training    Goals:  Patient goals :  To get back to PLOF    Short term goals  Time Frame for Short term goals: Two weeks  Short term goal 1: Pt to tolerate standing greater than 4 mins with 1-2 hand release at SBA for inc ind with grooming tasks  Short term goal 2: Pt to complete functional mobility at [facility] distances with no greater than SBA for inc ind with accessing environment  Short term goal 3: Pt to complete various functional transfers at SBA with 0 cues for safety in prep for return to work  Long term goals  Time Frame for Long term goals : NA d/t ELOS    Evaluation Complexity: Based on the findings of patient history, examination, clinical presentation, and decision making during this evaluation, this patient is of medium complexity. OT G-codes  Functional Assessment Tool Used: [facility]  Score: 15  Functional Limitation: Self care  Self Care Current Status (): At least 40 percent but less than 60 percent impaired, limited or restricted  Self Care Goal Status ():  At least 20 percent but less than 40 percent impaired, limited or restricted  AM-PAC Inpatient Daily Activity Raw Score: 15  AM-PAC Inpatient ADL T-Scale Score : 34.69  ADL Inpatient CMS 0-100% Score: 56.46  ADL Inpatient CMS G-Code Modifier : CK

## 2018-09-18 NOTE — CARE COORDINATION
9/18/18, 1:26 PM      Christopher Carr       Admitted from: PACU 9/17/2018/ [facility] day: 1   Location: [facility]06/[facility] Reason for admit: Spondylolisthesis of lumbar region [M43.16] Status: inpatient  Admit order signed?: yes  PMH:  has a past medical history of History of cerebral palsy. Procedure: 9/18/17 surgery by Dr Dotson Spikes:   1. L4-L5 decompressive laminectomy and facetectomy with an L4-L5  decompressive foraminotomy, bilateral, with an L4-L5 posterior lumbar  interbody fusion and possible fusion of the L4-L5 level. 2.  Insertion of one [place] interbody cage of a 10 x 22 mm mesh in place  through the right hand side L4-L5 annulotomy. 3.  Use of one small kit [place] DBM 2 x 5 cm for the fusion of the L4-L5  level. 4.  Instrumentation of lumbar spine levels of L4-L5 with use of the [place]  pedicle screw and hesham instrumentation system placed, bilateral.  Pertinent abnormal Imaging:no  Medications:  Scheduled Meds:   therapeutic multivitamin-minerals  1 tablet Oral Daily    vitamin C  500 mg Oral Daily    vitamin E  200 Units Oral Daily    Levonorgest-Eth Estrad 91-Day  1 tablet Oral Daily    sodium chloride flush  10 mL Intravenous 2 times per day    docusate sodium  100 mg Oral BID    oxyCODONE  10 mg Oral Q12H     Continuous Infusions:   sodium chloride 100 mL/hr at 09/18/18 0451      Pertinent Info/Orders/Treatment Plan:  PT/OT. Pain management. N/V checks. I&O. Monitor wound drain output. Ileus prevention measures. Diet: DIET GENERAL;   DVT Prophylaxis: SCD's ordered and on  Smoking status:  reports that she has never smoked.  She has never used smokeless tobacco.   Influenza Vaccination Screening Completed: n/a  Pneumonia Vaccination Screening Completed: yes  PCP: Elliott Reveal, PA  Readmission: no  Readmission Risk Score: 9%    Discharge Planning  Current Residence:  Private Residence  Living Arrangements:  Parent   Support Systems:  Family Members, Parent, Friends/Neighbors  Current

## 2018-09-18 NOTE — OP NOTE
[address] [place] [address]                                 OPERATIVE REPORT    PATIENT NAME: Rosemarie Balbuena                     :        1980  MED REC NO:   [id number]                           ROOM:       0006  ACCOUNT NO:   [de-identified]                           ADMIT DATE: 2018  PROVIDER:     Alicia Hernandez M.D.    DATE OF PROCEDURE:  2018    PREOPERATIVE DIAGNOSES:  1.  Grade 1 isthmic spondylolisthesis of the L4-L5 level. 2.  Lumbar disk herniation, bilateral and lumbar radiculopathy. POSTOPERATIVE DIAGNOSES:  1.  Grade 1 isthmic spondylolisthesis of the L4-L5 level. 2.  Lumbar disk herniation, bilateral and lumbar radiculopathy. PROCEDURES:  1. L4-L5 decompressive laminectomy and facetectomy with an L4-L5      decompressive foraminotomy, bilateral, with an L4-L5 posterior lumbar      interbody fusion and possible fusion of the L4-L5 level. 2.  Insertion of one [place] interbody cage of a 10 x 22 mm mesh in place      through the right hand side L4-L5 annulotomy. 3.  Use of one small kit Hi DBM 2 x 5 cm for the fusion of the L4-L5      level. 4.  Instrumentation of lumbar spine levels of L4-L5 with use of the [place]      pedicle screw and hesham instrumentation system placed, bilateral.    SURGEON: Alicia Hernandez M.D.    Trino Yepez:  Ernie Murray PA-C, certified. ANESTHESIA:  General.    BLOOD LOSS:  40 mL with 175 mL return from [employer]. DRAINS:  Hemovac x 2. COMPLICATIONS:  Zero. INDICATION:  The patient is 27years of age and suffers low back pain, leg  weakness, pain in her legs, and difficulty walking, coming from grade 1  isthmic spondylolisthesis at the L5-S1 level and spinal stenosis at the  L4-L5 level due to disk herniation that is bilateral.  I discussed with the  patient and her parents her condition. She is a lady with cerebral palsy.    She has had significant troubles hemostasis, and excised  disk material to be able to thoroughly curette and remove the disk within  this L4-L5 level to thoroughly relieve all areas of neurocompression and  prepare this interbody surface for fusion as well. Ultimately to the right  hand side of the L4-L5 annulotomy, the disk space was thoroughly curetted  and shaved to prepare this interbody surface for fusion to accept the 10 x  20 mm [place] interbody cage, well fixed at this L4-L5 level which fit very  well. This interbody device fit very nicely within this interbody surface. We placed this into the appropriate depth and location. This fit quite  nicely maintaining disk space distraction, so that we could further  decompress the opening foramina bilaterally. Once this was well seated, we  confirmed this position with direct observation and then set aside the  retractor, opened the pedicles of levels of L4-L5 at this point. With use  of a bur, each pedicle can be opened and then cannulated, ultimately tapped  with use of a 5.5 mm tap and also decortication carried out of the L4-5  level bilateral.  We would use a 6.5 mm diameter x 45 mm screw, placed in  L4-5 bilateral as well as a CrossLink. We then placed pedicle screws and  bridging rods over L4-L5 with two setscrews per side as well. We would  also compress the L4-L5 screw heads to maintain interbody compression of  this L4-L5 level. This fit very nicely. We also proceeded to irrigate  thoroughly, suctioned dry, and then apply the Hi DBM and local bone  grafting over levels of L4-L5 bilateral for the fusion technique and would  then back out the retractors, having used IrriSept solution prior to  Hi insertion. We placed two drains and closed in layers. Intraoperative x-rays reviewed that showed well-placed pedicle screws and  bridging rods over levels of the L4-L5 level with interbody cage fixation  of L4-5 as well.         Oc Bailey M.D.    D: 09/17/2018 16:00:11

## 2018-09-18 NOTE — PROGRESS NOTES
[address] [place] [address]  INPATIENT PHYSICAL THERAPY  EVALUATION  [facility] ORTHOPEDICS 7K - 7K-06/006-A    Time In: 1696  Time Out: 0933  Timed Code Treatment Minutes: 23 Minutes  Minutes: 38          Date: 2018  Patient Name: Jimmy Lubin,  Gender:  female        MRN: [id number]  : 1980  (45 y.o.)      Referring Practitioner: Dr Wilmer Person  Diagnosis: Spondylolisthesis  Additional Pertinent Hx: Pt admitted  for decompression and fusion lumbar area. Pt with h/o CP     Past Medical History:   Diagnosis Date    History of cerebral palsy      Past Surgical History:   Procedure Laterality Date    OTHER SURGICAL HISTORY      falopean tubes removed  20 yrs ago       Restrictions/Precautions:  General Precautions, Fall Risk                    Spinal Precautions: No Bending, No Lifting, No Twisting  Other position/activity restrictions: up with assist       Subjective:  Chart Reviewed: Yes  Patient assessed for rehabilitation services?: Yes  Family / Caregiver Present: Yes  Subjective: RN approved session,pt in bed, parents present. Pt agreeable to PT and parents supportive. General:  Overall Orientation Status: Within Functional Limits  Follows Commands: Within Functional Limits    Vision: Impaired  Vision Exceptions: Wears glasses at all times    Hearing: Within functional limits         Pain:  Yes.   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 She does not use an AD in the house and can walk room to room. She used 1 loftstrand/forearm  crutch when she went to work shop      Objective:       RLE AROM: WFL         LLE AROM : WFL         Strength RLE: Exception  Comment: grossly 4-/5    Strength LLE: Exception  Comment: grossly 4-/5          Balance  Sitting - Static: Good  Sitting - Dynamic: Good, -  Standing - Static: Fair, + (with walker)  Standing - Dynamic: Fair    Rolling to Right: Minimal assistance (of 1 with getting into hooklying position and then 1 assist to roll to the right with pt using bedrail with her left hand)  Supine to Sit: Moderate assistance (of 1 from right side ,use of bedrail,assit at trunk and assist at LEs)  Scooting: Moderate assistance, Minimal assistance (of 1 from edge of ebd and to get back further into chair)    Transfers  Sit to Stand: Minimal Assistance (of 1 from bed, cues on hand placement and tactile cues on technique  and balance )  Stand to sit: Minimal Assistance (of 1 to chair, cues on hand placement, assist to reach the armrest with her hands )       Ambulation 1  Surface: level tile  Device: Rolling Walker  Assistance: Minimal assistance (of 1)  Quality of Gait: mild unsteadiness, small strides, no heel strike or push off. tactile cues on posture   Distance: 4 feet forward and 3 feet backwards and turning to align up with chair. Exercises:  Comments: Pt completed 10 reps quad and glut sets and ankle pumps and AA heel slides with Min A . Ex comleted to imorve strength for function       Activity Tolerance:  Activity Tolerance: Patient limited by pain; Patient limited by endurance  Activity Tolerance: Pt had light headedness once seated in chair, Pt was reclined in chair and cool wash cloth placed on her forehead and neck.  Pt felt better after a few minutes     Treatment goal 2: sit to stand with SBA to get on and off various surfaces  Short term goal 3: ambulate with RW SBA for 100 feet or more to walk household distances   Short term goal 4: ascend/descend 2 steps with 1 HR and forearm crutch with CGA if needed to enter home  Long term goals  Time Frame for Long term goals : NA due to short ELOS    Evaluation Complexity: Based on the findings of patient history, examination, clinical presentation, and decision making during this evaluation, the evaluation of Miriam Ramirez  is of medium complexity. PT G-Codes  Functional Limitation: Mobility: Walking and moving around  Mobility: Walking and Moving Around Current Status (): At least 40 percent but less than 60 percent impaired, limited or restricted  Mobility: Walking and Moving Around Goal Status ():  At least 20 percent but less than 40 percent impaired, limited or restricted       AM-PAC Inpatient Mobility without Stair Climbing Raw Score : 14  AM-PAC Inpatient without Stair Climbing T-Scale Score : 40.85  Mobility Inpatient CMS 0-100% Score: 53.33  Mobility Inpatient without Stair CMS G-Code Modifier : CK

## 2018-09-18 NOTE — PROGRESS NOTES
Patients heart rate running between 135-157. Dr. Kit Ramey contacted and updated on patient condition at this time. Also, informed him that patient is feeling n/t \"all over\". Order for hospitalist received to address tachycardia. Dr. Geovanna Hyde on call for hospitalist, states he will be up to evaluate patient. Patients parents updated at this time.

## 2018-09-19 LAB
BASOPHILS # BLD: 0.1 %
BASOPHILS ABSOLUTE: 0 THOU/MM3 (ref 0–0.1)
EOSINOPHIL # BLD: 0.5 %
EOSINOPHILS ABSOLUTE: 0.1 THOU/MM3 (ref 0–0.4)
ERYTHROCYTE [DISTWIDTH] IN BLOOD BY AUTOMATED COUNT: 12.9 % (ref 11.5–14.5)
ERYTHROCYTE [DISTWIDTH] IN BLOOD BY AUTOMATED COUNT: 46.2 FL (ref 35–45)
HCT VFR BLD CALC: 30.9 % (ref 37–47)
HEMOGLOBIN: 10.5 GM/DL (ref 12–16)
IMMATURE GRANS (ABS): 0.08 THOU/MM3 (ref 0–0.07)
IMMATURE GRANULOCYTES: 0.6 %
LV EF: 60 %
LVEF MODALITY: NORMAL
LYMPHOCYTES # BLD: 18.3 %
LYMPHOCYTES ABSOLUTE: 2.6 THOU/MM3 (ref 1–4.8)
MCH RBC QN AUTO: 33.7 PG (ref 26–33)
MCHC RBC AUTO-ENTMCNC: 34 GM/DL (ref 32.2–35.5)
MCV RBC AUTO: 99 FL (ref 81–99)
MONOCYTES # BLD: 6.1 %
MONOCYTES ABSOLUTE: 0.9 THOU/MM3 (ref 0.4–1.3)
NUCLEATED RED BLOOD CELLS: 0 /100 WBC
PLATELET # BLD: 218 THOU/MM3 (ref 130–400)
PMV BLD AUTO: 11.9 FL (ref 9.4–12.4)
RBC # BLD: 3.12 MILL/MM3 (ref 4.2–5.4)
SEG NEUTROPHILS: 74.4 %
SEGMENTED NEUTROPHILS ABSOLUTE COUNT: 10.7 THOU/MM3 (ref 1.8–7.7)
WBC # BLD: 14.4 THOU/MM3 (ref 4.8–10.8)

## 2018-09-19 PROCEDURE — 6370000000 HC RX 637 (ALT 250 FOR IP): Performed by: ORTHOPAEDIC SURGERY

## 2018-09-19 PROCEDURE — 99232 SBSQ HOSP IP/OBS MODERATE 35: CPT | Performed by: INTERNAL MEDICINE

## 2018-09-19 PROCEDURE — 97116 GAIT TRAINING THERAPY: CPT

## 2018-09-19 PROCEDURE — 94761 N-INVAS EAR/PLS OXIMETRY MLT: CPT

## 2018-09-19 PROCEDURE — 94760 N-INVAS EAR/PLS OXIMETRY 1: CPT

## 2018-09-19 PROCEDURE — 36415 COLL VENOUS BLD VENIPUNCTURE: CPT

## 2018-09-19 PROCEDURE — 1200000000 HC SEMI PRIVATE

## 2018-09-19 PROCEDURE — 93306 TTE W/DOPPLER COMPLETE: CPT

## 2018-09-19 PROCEDURE — 97110 THERAPEUTIC EXERCISES: CPT

## 2018-09-19 PROCEDURE — 6370000000 HC RX 637 (ALT 250 FOR IP): Performed by: INTERNAL MEDICINE

## 2018-09-19 PROCEDURE — 85025 COMPLETE CBC W/AUTO DIFF WBC: CPT

## 2018-09-19 PROCEDURE — 97530 THERAPEUTIC ACTIVITIES: CPT

## 2018-09-19 PROCEDURE — 6370000000 HC RX 637 (ALT 250 FOR IP): Performed by: PHYSICIAN ASSISTANT

## 2018-09-19 RX ORDER — POLYETHYLENE GLYCOL 3350 17 G/17G
17 POWDER, FOR SOLUTION ORAL DAILY
Status: DISCONTINUED | OUTPATIENT
Start: 2018-09-19 | End: 2018-09-22 | Stop reason: HOSPADM

## 2018-09-19 RX ADMIN — DOCUSATE SODIUM 100 MG: 100 CAPSULE, LIQUID FILLED ORAL at 21:10

## 2018-09-19 RX ADMIN — VITAMIN E CAP 100 UNIT 200 UNITS: 100 CAP at 10:20

## 2018-09-19 RX ADMIN — LEVONORGESTREL AND ETHINYL ESTRADIOL 1 TABLET: KIT at 10:18

## 2018-09-19 RX ADMIN — POLYETHYLENE GLYCOL 3350 17 G: 17 POWDER, FOR SOLUTION ORAL at 10:20

## 2018-09-19 RX ADMIN — METOPROLOL TARTRATE 25 MG: 25 TABLET ORAL at 21:10

## 2018-09-19 RX ADMIN — Medication 500 MG: at 10:20

## 2018-09-19 RX ADMIN — OXYCODONE HYDROCHLORIDE 10 MG: 10 TABLET, FILM COATED, EXTENDED RELEASE ORAL at 10:20

## 2018-09-19 RX ADMIN — MULTIPLE VITAMINS W/ MINERALS TAB 1 TABLET: TAB at 10:19

## 2018-09-19 RX ADMIN — CYCLOBENZAPRINE 10 MG: 10 TABLET, FILM COATED ORAL at 21:20

## 2018-09-19 RX ADMIN — OXYCODONE HYDROCHLORIDE 10 MG: 10 TABLET, FILM COATED, EXTENDED RELEASE ORAL at 21:10

## 2018-09-19 RX ADMIN — DOCUSATE SODIUM 100 MG: 100 CAPSULE, LIQUID FILLED ORAL at 10:20

## 2018-09-19 RX ADMIN — METOPROLOL TARTRATE 25 MG: 25 TABLET ORAL at 10:20

## 2018-09-19 ASSESSMENT — PAIN DESCRIPTION - ONSET
ONSET: ON-GOING
ONSET: ON-GOING

## 2018-09-19 ASSESSMENT — PAIN DESCRIPTION - PAIN TYPE
TYPE: SURGICAL PAIN

## 2018-09-19 ASSESSMENT — PAIN DESCRIPTION - DESCRIPTORS
DESCRIPTORS: ACHING
DESCRIPTORS: ACHING

## 2018-09-19 ASSESSMENT — PAIN SCALES - GENERAL
PAINLEVEL_OUTOF10: 5
PAINLEVEL_OUTOF10: 7

## 2018-09-19 ASSESSMENT — PAIN DESCRIPTION - ORIENTATION
ORIENTATION: LEFT;LOWER
ORIENTATION: LOWER;LEFT
ORIENTATION: LOWER;LEFT

## 2018-09-19 ASSESSMENT — PAIN DESCRIPTION - FREQUENCY
FREQUENCY: INTERMITTENT
FREQUENCY: INTERMITTENT

## 2018-09-19 ASSESSMENT — PAIN DESCRIPTION - LOCATION
LOCATION: BACK

## 2018-09-19 ASSESSMENT — PAIN DESCRIPTION - PROGRESSION
CLINICAL_PROGRESSION: NOT CHANGED
CLINICAL_PROGRESSION: NOT CHANGED

## 2018-09-19 NOTE — PROGRESS NOTES
Department of Orthopedic Surgery  Spine Service  Shahid Busby PA-C Progress Note        Subjective:  Pt lying in bed feeling well. Was able to be up to the chair yesterday and felt ok on her feet. Still continues to have tachycardia with some chest fluttering one time yesterday. Will continue to monitor. Appreciate hospitalist help. Vitals  VITALS:  /70   Pulse 103   Temp 98.7 °F (37.1 °C) (Oral)   Resp 16   Ht 4' 11\" (1.499 m)   Wt 142 lb 3.2 oz (64.5 kg)   LMP 06/25/2018   SpO2 94%   BMI 28.72 kg/m²   24HR INTAKE/OUTPUT:    Intake/Output Summary (Last 24 hours) at 09/19/18 0658  Last data filed at 09/19/18 0630   Gross per 24 hour   Intake             1207 ml   Output             3230 ml   Net            -2023 ml     URINARY CATHETER OUTPUT (Ryan):  Urethral Catheter Latex 16 fr-Output (mL): 650 mL  DRAIN/TUBE OUTPUT:  Closed/Suction Drain Posterior Back Accordion 10 Bulgarian-Output (ml): 90 ml  Closed/Suction Drain Posterior Back Accordion 10 Bulgarian-Output (ml): 90 ml      PHYSICAL EXAM:    Orientation:  alert and oriented to person, place and time    Incision:  dressing in place, clean, dry, intact  Lower Extremity Motor :  quadriceps, extensor hallucis longus, dorsiflexion, plantarflexion 5/5 bilaterally  Lower Extremity Sensory:  Intact L1-S1    Flatus:  positive    ABNORMAL EXAM FINDINGS:  none    LABS:    HgB:    Lab Results   Component Value Date    HGB 10.5 09/19/2018         ASSESSMENT AND PLAN:    Post operative day 2    1:  Monitor labs and drain output  2:  Activity Level:  OOB with therapy   3:  Pain Control:  Controlled with medication  4:  Discharge Planning:  Advancing activity, working for bm, need tachycardia to resolve  5:  Miralax for bm  6:  Remove ryan  7:  EKG if complaints of chest fluttering again.

## 2018-09-19 NOTE — CONSULTS
Consults       Consult Progress      Patient:  Jimmy Lubin  YOB: 1980    MRN: [id number]     Acct: [de-identified]      Chief Complaint:  tachycardia    Date of Service: Pt seen/examined in consultation on 9.17.2018    History Of Present Illness:      45 y.o. female who we are asked to see/evaluate by Lela Martin MD for medical management of evaluation of tachycardia, patient has a hx of CP    Subjective-  Doing okay  Has history of cerebral palsy  Tachycardia better with Lopressor    Scheduled Meds:   polyethylene glycol  17 g Oral Daily    metoprolol tartrate  25 mg Oral BID    therapeutic multivitamin-minerals  1 tablet Oral Daily    vitamin C  500 mg Oral Daily    vitamin E  200 Units Oral Daily    Levonorgest-Eth Estrad 91-Day  1 tablet Oral Daily    sodium chloride flush  10 mL Intravenous 2 times per day    docusate sodium  100 mg Oral BID    oxyCODONE  10 mg Oral Q12H     Continuous Infusions:   sodium chloride 100 mL/hr at 09/18/18 0451     PRN Meds:.sodium chloride flush, acetaminophen, acetaminophen, cyclobenzaprine, magnesium hydroxide, ondansetron, oxyCODONE-acetaminophen **OR** oxyCODONE-acetaminophen, HYDROmorphone **OR** HYDROmorphone  Diet:  DIET GENERAL;    REVIEW OF SYSTEMS:   Pertinent positives as noted in the HPI. All other systems reviewed and negative. PHYSICAL EXAM:  BP (!) 109/58   Pulse 114   Temp 98.6 °F (37 °C) (Oral)   Resp 16   Ht 4' 11\" (1.499 m)   Wt 142 lb 3.2 oz (64.5 kg)   LMP 06/25/2018   SpO2 94%   BMI 28.72 kg/m²   General appearance:  No apparent distress, appears stated age and cooperative. HEENT:  Normal cephalic, atraumatic without obvious deformity. Pupils equal, round, and reactive to light. Extra ocular muscles intact. Conjunctivae/corneas clear. Neck: Supple, with full range of motion. no jugular venous distention. Trachea midline. no carotid bruits  Respiratory:  Normal respiratory effort.  Clear to auscultation, bilaterally without Rales/Wheezes/Rhonchi. Breath sounds equal bilaterally  Cardiovascular:  Regular rate and rhythm with normal S1/S2 without murmurs, rubs or gallops. PMI non displaced  Abdomen: Soft, non-tender, non-distended with normal bowel sounds. No guarding, rebound. Musculoskeletal:  No clubbing, cyanosis or edema bilaterally. No palp cords  Skin: Skin color, texture, turgor normal.  No rashes or lesions, or suspicious lesions. Neurologic:   Cranial nerves: II-XII intact, grossly non-focal.  Psychiatric:  Alert and oriented, thought content appropriate, normal insight  Capillary Refill: Brisk,< 2 seconds   Peripheral Pulses: +2 palpable, equal bilaterally upper and lower extremities  Lymphatics: no lymphadenopathy    Labs:     Recent Labs      09/17/18 1926 09/18/18   0548  09/19/18   0533   WBC  18.3*  14.0*  14.4*   HGB  12.7  10.9*  10.5*   HCT  38.1  31.4*  30.9*   PLT  291  252  218     Recent Labs      09/17/18 1926 09/18/18   0548   NA  135  137   K  4.3  4.4   CL  100  100   CO2  17*  20*   BUN  8  5*   CREATININE  0.5  0.6   CALCIUM  8.2*  8.6     No results for input(s): AST, ALT, BILIDIR, BILITOT, ALKPHOS in the last 72 hours. No results for input(s): INR in the last 72 hours. No results for input(s): Saralee Rojelio in the last 72 hours. Urinalysis:  No results found for: Marbin Lucie, BACTERIA, RBCUA, BLOODU, [address] [place] [address]    Radiology: I have reviewed the radiology reports with the following interpretation:     XR LUMBAR SPINE (2-3 VIEWS)   Final Result   1. Slight thoracolumbar dextro scoliosis. Status post posterior lumbar fusion, L4-5. Pelvic screws and rods are present. Description device is seen at the L4-5 level. Lumbar vertebra appear normally aligned. **This report has been created using voice recognition software. It may contain minor errors which are inherent in voice recognition technology. **      Final report electronically signed by Dr. Kamran Perez on 9/18/2018 9:07 AM      XR Lumbar Spine 1 VW   Final Result      Surgical changes as detailed above. Final report electronically signed by Dr. Mayra Knott on 9/17/2018 3:36 PM      XR Lumbar Spine 1 VW   Final Result   Intraoperative  appearance of the lumbar spine. **This report has been created using voice recognition software. It may contain minor errors which are inherent in voice recognition technology. **      Final report electronically signed by Dr. Ana Paula Hughes on 9/17/2018 2:32 PM             EKG:  I have reviewed the EKG with the following interpretation:    Pending      DVT prophylaxis: [] Lovenox                                 [x] SCDs                                 [] SQ Heparin                                 [] Encourage ambulation           [] Already on Anticoagulation      Code Status: Full Code    PT/OT Eval Status: Active and ongoing      ASSESSMENT:    C/Gloria Frederick 110[address] Problems    Diagnosis Date Noted    Spondylolisthesis of lumbar region [M43.16] 09/17/2018    Tachycardia, unspecified [R00.0] 09/17/2018       PLAN:    Sinus tachycardia likely related to pain versus post surgery- bladder 2-D echo of the heart and add Lopressor with parameters  Status post lumbar laminectomy- per orthopedic surgery    Echocardiogram pending  Tachycardia better with Lopressor  We will follow    Thank you for the consultation.     Electronically signed by Fabián Malhotra MD on 9/19/2018 at 3:55 PM

## 2018-09-20 LAB
ANION GAP SERPL CALCULATED.3IONS-SCNC: 10 MEQ/L (ref 8–16)
BASOPHILS # BLD: 0.2 %
BASOPHILS ABSOLUTE: 0 THOU/MM3 (ref 0–0.1)
BUN BLDV-MCNC: 7 MG/DL (ref 7–22)
CALCIUM SERPL-MCNC: 8.6 MG/DL (ref 8.5–10.5)
CHLORIDE BLD-SCNC: 101 MEQ/L (ref 98–111)
CO2: 27 MEQ/L (ref 23–33)
CREAT SERPL-MCNC: 0.5 MG/DL (ref 0.4–1.2)
EOSINOPHIL # BLD: 1.9 %
EOSINOPHILS ABSOLUTE: 0.2 THOU/MM3 (ref 0–0.4)
ERYTHROCYTE [DISTWIDTH] IN BLOOD BY AUTOMATED COUNT: 12.9 % (ref 11.5–14.5)
ERYTHROCYTE [DISTWIDTH] IN BLOOD BY AUTOMATED COUNT: 47.9 FL (ref 35–45)
GFR SERPL CREATININE-BSD FRML MDRD: > 90 ML/MIN/1.73M2
GLUCOSE BLD-MCNC: 137 MG/DL (ref 70–108)
HCT VFR BLD CALC: 29.3 % (ref 37–47)
HEMOGLOBIN: 9.7 GM/DL (ref 12–16)
IMMATURE GRANS (ABS): 0.05 THOU/MM3 (ref 0–0.07)
IMMATURE GRANULOCYTES: 0.5 %
LYMPHOCYTES # BLD: 26.9 %
LYMPHOCYTES ABSOLUTE: 2.7 THOU/MM3 (ref 1–4.8)
MCH RBC QN AUTO: 33.6 PG (ref 26–33)
MCHC RBC AUTO-ENTMCNC: 33.1 GM/DL (ref 32.2–35.5)
MCV RBC AUTO: 101.4 FL (ref 81–99)
MONOCYTES # BLD: 6.5 %
MONOCYTES ABSOLUTE: 0.7 THOU/MM3 (ref 0.4–1.3)
NUCLEATED RED BLOOD CELLS: 0 /100 WBC
PLATELET # BLD: 215 THOU/MM3 (ref 130–400)
PMV BLD AUTO: 11.9 FL (ref 9.4–12.4)
POTASSIUM SERPL-SCNC: 4 MEQ/L (ref 3.5–5.2)
RBC # BLD: 2.89 MILL/MM3 (ref 4.2–5.4)
SEG NEUTROPHILS: 64 %
SEGMENTED NEUTROPHILS ABSOLUTE COUNT: 6.5 THOU/MM3 (ref 1.8–7.7)
SODIUM BLD-SCNC: 138 MEQ/L (ref 135–145)
WBC # BLD: 10.1 THOU/MM3 (ref 4.8–10.8)

## 2018-09-20 PROCEDURE — 1200000000 HC SEMI PRIVATE

## 2018-09-20 PROCEDURE — 97530 THERAPEUTIC ACTIVITIES: CPT

## 2018-09-20 PROCEDURE — 97110 THERAPEUTIC EXERCISES: CPT

## 2018-09-20 PROCEDURE — 97535 SELF CARE MNGMENT TRAINING: CPT

## 2018-09-20 PROCEDURE — 36415 COLL VENOUS BLD VENIPUNCTURE: CPT

## 2018-09-20 PROCEDURE — 6370000000 HC RX 637 (ALT 250 FOR IP): Performed by: ORTHOPAEDIC SURGERY

## 2018-09-20 PROCEDURE — 6370000000 HC RX 637 (ALT 250 FOR IP): Performed by: PHYSICIAN ASSISTANT

## 2018-09-20 PROCEDURE — 85025 COMPLETE CBC W/AUTO DIFF WBC: CPT

## 2018-09-20 PROCEDURE — 94761 N-INVAS EAR/PLS OXIMETRY MLT: CPT

## 2018-09-20 PROCEDURE — 97116 GAIT TRAINING THERAPY: CPT

## 2018-09-20 PROCEDURE — 2580000003 HC RX 258: Performed by: ORTHOPAEDIC SURGERY

## 2018-09-20 PROCEDURE — 99232 SBSQ HOSP IP/OBS MODERATE 35: CPT | Performed by: INTERNAL MEDICINE

## 2018-09-20 PROCEDURE — 6370000000 HC RX 637 (ALT 250 FOR IP): Performed by: INTERNAL MEDICINE

## 2018-09-20 PROCEDURE — 80048 BASIC METABOLIC PNL TOTAL CA: CPT

## 2018-09-20 RX ORDER — BISACODYL 10 MG
10 SUPPOSITORY, RECTAL RECTAL PRN
Status: DISCONTINUED | OUTPATIENT
Start: 2018-09-20 | End: 2018-09-22 | Stop reason: HOSPADM

## 2018-09-20 RX ORDER — SODIUM PHOSPHATE,MONO-DIBASIC 19G-7G/118
1 ENEMA (ML) RECTAL ONCE
Status: COMPLETED | OUTPATIENT
Start: 2018-09-20 | End: 2018-09-21

## 2018-09-20 RX ADMIN — OXYCODONE HYDROCHLORIDE AND ACETAMINOPHEN 1 TABLET: 5; 325 TABLET ORAL at 17:39

## 2018-09-20 RX ADMIN — MULTIPLE VITAMINS W/ MINERALS TAB 1 TABLET: TAB at 08:46

## 2018-09-20 RX ADMIN — OXYCODONE HYDROCHLORIDE 10 MG: 10 TABLET, FILM COATED, EXTENDED RELEASE ORAL at 20:44

## 2018-09-20 RX ADMIN — METOPROLOL TARTRATE 25 MG: 25 TABLET ORAL at 17:36

## 2018-09-20 RX ADMIN — VITAMIN E CAP 100 UNIT 200 UNITS: 100 CAP at 08:46

## 2018-09-20 RX ADMIN — DOCUSATE SODIUM 100 MG: 100 CAPSULE, LIQUID FILLED ORAL at 20:44

## 2018-09-20 RX ADMIN — POLYETHYLENE GLYCOL 3350 17 G: 17 POWDER, FOR SOLUTION ORAL at 08:45

## 2018-09-20 RX ADMIN — METOPROLOL TARTRATE 25 MG: 25 TABLET ORAL at 08:46

## 2018-09-20 RX ADMIN — Medication 10 ML: at 20:44

## 2018-09-20 RX ADMIN — DOCUSATE SODIUM 100 MG: 100 CAPSULE, LIQUID FILLED ORAL at 08:45

## 2018-09-20 RX ADMIN — OXYCODONE HYDROCHLORIDE 10 MG: 10 TABLET, FILM COATED, EXTENDED RELEASE ORAL at 08:45

## 2018-09-20 RX ADMIN — LEVONORGESTREL AND ETHINYL ESTRADIOL 1 TABLET: KIT at 08:48

## 2018-09-20 RX ADMIN — OXYCODONE HYDROCHLORIDE AND ACETAMINOPHEN 1 TABLET: 5; 325 TABLET ORAL at 05:09

## 2018-09-20 RX ADMIN — Medication 500 MG: at 08:46

## 2018-09-20 ASSESSMENT — PAIN DESCRIPTION - ORIENTATION
ORIENTATION: LEFT;LOWER
ORIENTATION: MID;LOWER
ORIENTATION: LOWER;MID
ORIENTATION: LOWER;MID

## 2018-09-20 ASSESSMENT — PAIN DESCRIPTION - PROGRESSION: CLINICAL_PROGRESSION: NOT CHANGED

## 2018-09-20 ASSESSMENT — PAIN DESCRIPTION - FREQUENCY
FREQUENCY: INTERMITTENT

## 2018-09-20 ASSESSMENT — PAIN SCALES - GENERAL
PAINLEVEL_OUTOF10: 6
PAINLEVEL_OUTOF10: 5
PAINLEVEL_OUTOF10: 5
PAINLEVEL_OUTOF10: 4
PAINLEVEL_OUTOF10: 5
PAINLEVEL_OUTOF10: 4
PAINLEVEL_OUTOF10: 5
PAINLEVEL_OUTOF10: 0
PAINLEVEL_OUTOF10: 6
PAINLEVEL_OUTOF10: 6

## 2018-09-20 ASSESSMENT — PAIN DESCRIPTION - PAIN TYPE
TYPE: SURGICAL PAIN

## 2018-09-20 ASSESSMENT — PAIN DESCRIPTION - LOCATION
LOCATION: BACK

## 2018-09-20 ASSESSMENT — PAIN DESCRIPTION - DESCRIPTORS
DESCRIPTORS: ACHING;DISCOMFORT
DESCRIPTORS: ACHING
DESCRIPTORS: ACHING;DISCOMFORT
DESCRIPTORS: ACHING;DISCOMFORT

## 2018-09-20 ASSESSMENT — PAIN DESCRIPTION - ONSET: ONSET: ON-GOING

## 2018-09-20 NOTE — CONSULTS
Consults       Consult Progress      Patient:  Susan Moreira  YOB: 1980    MRN: [id number]     Acct: [de-identified]      Chief Complaint:  tachycardia    Date of Service: Pt seen/examined in consultation on 9.17.2018    History Of Present Illness:      45 y.o. female who we are asked to see/evaluate by Mikal Pierre MD for medical management of evaluation of tachycardia, patient has a hx of CP    Subjective-  Doing okay  Has history of cerebral palsy  Tachycardia resolved    Scheduled Meds:   sodium phosphate  1 enema Rectal Once    polyethylene glycol  17 g Oral Daily    metoprolol tartrate  25 mg Oral BID    therapeutic multivitamin-minerals  1 tablet Oral Daily    vitamin C  500 mg Oral Daily    vitamin E  200 Units Oral Daily    Levonorgest-Eth Estrad 91-Day  1 tablet Oral Daily    sodium chloride flush  10 mL Intravenous 2 times per day    docusate sodium  100 mg Oral BID    oxyCODONE  10 mg Oral Q12H     Continuous Infusions:    PRN Meds:.bisacodyl, sodium chloride flush, acetaminophen, acetaminophen, cyclobenzaprine, magnesium hydroxide, ondansetron, oxyCODONE-acetaminophen **OR** oxyCODONE-acetaminophen, HYDROmorphone **OR** HYDROmorphone  Diet:  DIET GENERAL;  Dietary Nutrition Supplements: Other Oral Supplement (see comment)    REVIEW OF SYSTEMS:   Pertinent positives as noted in the HPI. All other systems reviewed and negative. PHYSICAL EXAM:  BP (!) 102/59   Pulse 101   Temp 97.7 °F (36.5 °C) (Oral)   Resp 16   Ht 4' 11\" (1.499 m)   Wt 142 lb 3.2 oz (64.5 kg)   LMP 06/25/2018   SpO2 96%   BMI 28.72 kg/m²   General appearance:  No apparent distress, appears stated age and cooperative. HEENT:  Normal cephalic, atraumatic without obvious deformity. Pupils equal, round, and reactive to light. Extra ocular muscles intact. Conjunctivae/corneas clear. Neck: Supple, with full range of motion. no jugular venous distention. Trachea midline. no carotid bruits  Respiratory: Normal respiratory effort. Clear to auscultation, bilaterally without Rales/Wheezes/Rhonchi. Breath sounds equal bilaterally  Cardiovascular:  Regular rate and rhythm with normal S1/S2 without murmurs, rubs or gallops. PMI non displaced  Abdomen: Soft, non-tender, non-distended with normal bowel sounds. No guarding, rebound. Musculoskeletal:  No clubbing, cyanosis or edema bilaterally. No palp cords  Skin: Skin color, texture, turgor normal.  No rashes or lesions, or suspicious lesions. Neurologic:   Cranial nerves: II-XII intact, grossly non-focal.  Psychiatric:  Alert and oriented, thought content appropriate, normal insight  Capillary Refill: Brisk,< 2 seconds   Peripheral Pulses: +2 palpable, equal bilaterally upper and lower extremities  Lymphatics: no lymphadenopathy    Labs:     Recent Labs      09/18/18   0548  09/19/18   0533  09/20/18   0525   WBC  14.0*  14.4*  10.1   HGB  10.9*  10.5*  9.7*   HCT  31.4*  30.9*  29.3*   PLT  252  218  215     Recent Labs      09/17/18   1926  09/18/18   0548   NA  135  137   K  4.3  4.4   CL  100  100   CO2  17*  20*   BUN  8  5*   CREATININE  0.5  0.6   CALCIUM  8.2*  8.6     No results for input(s): AST, ALT, BILIDIR, BILITOT, ALKPHOS in the last 72 hours. No results for input(s): INR in the last 72 hours. No results for input(s): Lesley Restrepoine in the last 72 hours. Urinalysis:  No results found for: [place] , BACTERIA, RBCUA, BLOODU, [address], [place] [address]    Radiology: I have reviewed the radiology reports with the following interpretation:     XR LUMBAR SPINE (2-3 VIEWS)   Final Result   1. Slight thoracolumbar dextro scoliosis. Status post posterior lumbar fusion, L4-5. Pelvic screws and rods are present. Description device is seen at the L4-5 level. Lumbar vertebra appear normally aligned. **This report has been created using voice recognition software. It may contain minor errors which are inherent in voice recognition technology. **

## 2018-09-20 NOTE — PROGRESS NOTES
Number of Steps: 2 stevo with 1 grab bar  Home Equipment: Jose David Rape, 4 wheeled walker (loftstrand crutch)     Objective:  Rolling to Right: Contact guard assistance  Supine to Sit: Contact guard assistance  Sit to Supine: Unable to assess (Pt left sitting up in bedside chair. )    Transfers  Sit to Stand: Minimal Assistance (From EOB. Cuing for hand placement. )  Stand to sit: Contact guard assistance (Cuing to reach back for chair before sitting for decreased fall risk. )       Ambulation 1  Surface: level tile  Device: Rolling Walker  Assistance: Contact guard assistance;Minimal assistance  Quality of Gait: Slow samantha. Mild instability. Deminished B heel strike. Cuing to stay closer to AD  Distance: 50 feet x1       Exercises:  Exercises  Comments: Seated in bedside chair pt completed BLE ankle pumps, LAQ, marches, hip abd/add all x10 reps to increase strength for improved functional mobiltiy. Activity Tolerance:  Activity Tolerance: Patient limited by endurance; Patient Tolerated treatment well    Assessment: Body structures, Functions, Activity limitations: Decreased endurance, Decreased functional mobility , Decreased strength, Decreased balance  Assessment: Pt tolerated session fairly well. Limited by decreased strength, decreased mobility, decreased endurance. Would benefit from continued therapy at discharge. Prognosis: Good     REQUIRES PT FOLLOW UP: Yes  Discharge Recommendations: Continue to assess pending progress, Patient would benefit from continued therapy after discharge, 24 hour supervision or assist    Patient Education:  Patient Education: POC, log roll, gait    Equipment Recommendations:  Equipment Needed: No    Safety:  Type of devices:  All fall risk precautions in place, Call light within reach, Chair alarm in place, Left in chair, Gait belt, Nurse notified  Restraints  Initially in place: No    Plan:  Times per week: 7 X O  Times per day: Daily  Specific instructions for Next Treatment: ther ex-HEP, mobility, gait, balance, endurance   Current Treatment Recommendations: Strengthening, Balance Training, Endurance Training, Functional Mobility Training, Transfer Training, Gait Training, Stair training, Home Exercise Program    Goals:  Patient goals : Go home with parents    Short term goals  Time Frame for Short term goals:  1week  Short term goal 1: supine to sit and return with log roll and SBA to get in and out of bed   Short term goal 2: sit to stand with SBA to get on and off various surfaces  Short term goal 3: ambulate with RW SBA for 100 feet or more to walk household distances   Short term goal 4: ascend/descend 2 steps with 1 HR and forearm crutch with CGA if needed to enter home    Long term goals  Time Frame for Long term goals : NA due to short ELOS            AM-PAC Inpatient Mobility without Stair Climbing Raw Score : 15  AM-PAC Inpatient without Stair Climbing T-Scale Score : 43.03  Mobility Inpatient CMS 0-100% Score: 47.43  Mobility Inpatient without Stair CMS G-Code Modifier : CK

## 2018-09-20 NOTE — CARE COORDINATION
9/20/18, 1:19 PM      echoBase day: 3  Location: [facility]06/006- Reason for admit: Spondylolisthesis of lumbar region [M43.16]   Procedure: 9/17/18 surgery by Dr Carroll Speaks:   1.  L4-L5 decompressive laminectomy and facetectomy with an L4-L5  decompressive foraminotomy, bilateral, with an L4-L5 posterior lumbar  interbody fusion and possible fusion of the L4-L5 level. 2.  Insertion of one [place] interbody cage of a 10 x 22 mm mesh in place  through the right hand side L4-L5 annulotomy. 3.  Use of one small kit [place] DBM 2 x 5 cm for the fusion of the L4-L5  level. 4.  Instrumentation of lumbar spine levels of L4-L5 with use of the [place]  pedicle screw and hesham instrumentation system placed, bilateral.  Treatment Plan of Care: I&O. Monitor wound drain output. Pain management, N/V checks. PT/OT. Ileus prevention measures. Abdominal binder. IS  PCP: LARY Dukes  Readmission Risk Score: 9%  Discharge Plan: I spoke with Simone Tay and her parents. Mom said that therapists recommending [facility] PT/OT. Mom would also like [facility] nursing for monitoring VS and wound. Wound drain management, if home with wound drain. They are anticipating discharge home tomorrow if she has BM. SW consulted for [facility] - mom asking about [place] Medicaid coverage for [facility]. All agencies in network?

## 2018-09-21 PROCEDURE — 6370000000 HC RX 637 (ALT 250 FOR IP): Performed by: PHYSICIAN ASSISTANT

## 2018-09-21 PROCEDURE — 6370000000 HC RX 637 (ALT 250 FOR IP): Performed by: ORTHOPAEDIC SURGERY

## 2018-09-21 PROCEDURE — 97535 SELF CARE MNGMENT TRAINING: CPT

## 2018-09-21 PROCEDURE — 6370000000 HC RX 637 (ALT 250 FOR IP): Performed by: INTERNAL MEDICINE

## 2018-09-21 PROCEDURE — 94762 N-INVAS EAR/PLS OXIMTRY CONT: CPT

## 2018-09-21 PROCEDURE — 2580000003 HC RX 258: Performed by: ORTHOPAEDIC SURGERY

## 2018-09-21 PROCEDURE — 1200000000 HC SEMI PRIVATE

## 2018-09-21 PROCEDURE — 97116 GAIT TRAINING THERAPY: CPT

## 2018-09-21 PROCEDURE — 97110 THERAPEUTIC EXERCISES: CPT

## 2018-09-21 RX ORDER — HYDROCODONE BITARTRATE AND ACETAMINOPHEN 5; 325 MG/1; MG/1
1 TABLET ORAL EVERY 4 HOURS PRN
Status: DISCONTINUED | OUTPATIENT
Start: 2018-09-21 | End: 2018-09-22 | Stop reason: HOSPADM

## 2018-09-21 RX ORDER — HYDROCODONE BITARTRATE AND ACETAMINOPHEN 5; 325 MG/1; MG/1
2 TABLET ORAL EVERY 4 HOURS PRN
Status: DISCONTINUED | OUTPATIENT
Start: 2018-09-21 | End: 2018-09-22 | Stop reason: HOSPADM

## 2018-09-21 RX ADMIN — HYDROCODONE BITARTRATE AND ACETAMINOPHEN 1 TABLET: 5; 325 TABLET ORAL at 09:37

## 2018-09-21 RX ADMIN — METOPROLOL TARTRATE 25 MG: 25 TABLET ORAL at 09:35

## 2018-09-21 RX ADMIN — DOCUSATE SODIUM 100 MG: 100 CAPSULE, LIQUID FILLED ORAL at 09:35

## 2018-09-21 RX ADMIN — MULTIPLE VITAMINS W/ MINERALS TAB 1 TABLET: TAB at 09:35

## 2018-09-21 RX ADMIN — SODIUM PHOSPHATE 1 ENEMA: 7; 19 ENEMA RECTAL at 21:40

## 2018-09-21 RX ADMIN — OXYCODONE HYDROCHLORIDE 10 MG: 10 TABLET, FILM COATED, EXTENDED RELEASE ORAL at 21:40

## 2018-09-21 RX ADMIN — LEVONORGESTREL AND ETHINYL ESTRADIOL 1 TABLET: KIT at 09:38

## 2018-09-21 RX ADMIN — VITAMIN E CAP 100 UNIT 200 UNITS: 100 CAP at 09:35

## 2018-09-21 RX ADMIN — BISACODYL 10 MG: 10 SUPPOSITORY RECTAL at 17:15

## 2018-09-21 RX ADMIN — DOCUSATE SODIUM 100 MG: 100 CAPSULE, LIQUID FILLED ORAL at 21:39

## 2018-09-21 RX ADMIN — OXYCODONE HYDROCHLORIDE 10 MG: 10 TABLET, FILM COATED, EXTENDED RELEASE ORAL at 11:56

## 2018-09-21 RX ADMIN — Medication 10 ML: at 09:46

## 2018-09-21 RX ADMIN — Medication 10 ML: at 21:39

## 2018-09-21 RX ADMIN — METOPROLOL TARTRATE 25 MG: 25 TABLET ORAL at 21:39

## 2018-09-21 RX ADMIN — MAGESIUM CITRATE 296 ML: 1.75 LIQUID ORAL at 09:38

## 2018-09-21 RX ADMIN — Medication 500 MG: at 09:35

## 2018-09-21 RX ADMIN — MAGNESIUM HYDROXIDE 30 ML: 400 SUSPENSION ORAL at 15:50

## 2018-09-21 RX ADMIN — HYDROCODONE BITARTRATE AND ACETAMINOPHEN 1 TABLET: 5; 325 TABLET ORAL at 15:43

## 2018-09-21 RX ADMIN — OXYCODONE HYDROCHLORIDE AND ACETAMINOPHEN 1 TABLET: 5; 325 TABLET ORAL at 03:44

## 2018-09-21 RX ADMIN — POLYETHYLENE GLYCOL 3350 17 G: 17 POWDER, FOR SOLUTION ORAL at 09:34

## 2018-09-21 ASSESSMENT — PAIN DESCRIPTION - ORIENTATION: ORIENTATION: LOWER;MID

## 2018-09-21 ASSESSMENT — PAIN SCALES - GENERAL
PAINLEVEL_OUTOF10: 5
PAINLEVEL_OUTOF10: 4
PAINLEVEL_OUTOF10: 0
PAINLEVEL_OUTOF10: 5
PAINLEVEL_OUTOF10: 0
PAINLEVEL_OUTOF10: 5
PAINLEVEL_OUTOF10: 5
PAINLEVEL_OUTOF10: 0
PAINLEVEL_OUTOF10: 5
PAINLEVEL_OUTOF10: 0
PAINLEVEL_OUTOF10: 0
PAINLEVEL_OUTOF10: 5

## 2018-09-21 ASSESSMENT — PAIN - FUNCTIONAL ASSESSMENT: PAIN_FUNCTIONAL_ASSESSMENT: 0-10

## 2018-09-21 ASSESSMENT — PAIN DESCRIPTION - PAIN TYPE
TYPE: SURGICAL PAIN
TYPE: SURGICAL PAIN

## 2018-09-21 ASSESSMENT — PAIN DESCRIPTION - LOCATION
LOCATION: BACK
LOCATION: BACK

## 2018-09-21 ASSESSMENT — PAIN DESCRIPTION - DESCRIPTORS: DESCRIPTORS: SORE

## 2018-09-21 NOTE — PROGRESS NOTES
Angela Chacon 60  INPATIENT OCCUPATIONAL THERAPY  [facility] ORTHOPEDICS 7K  DAILY NOTE    Time:  Time In: 1005  Time Out: 1013  Timed Code Treatment Minutes: 8 Minutes  Minutes: 8          Date: 2018  Patient Name: Katherin Welch,   Gender: female      Room: [facility]06/006-A  MRN: [id number]  : 1980  (45 y.o.)  Referring Practitioner: Apolonia Voss MD  Diagnosis: Spondylolistesis of lumbar region  Additional Pertinent Hx:  Pt admitted  for decompression and fusion lumbar area. Pt with h/o CP     Past Medical History:   Diagnosis Date    History of cerebral palsy      Past Surgical History:   Procedure Laterality Date    OTHER SURGICAL HISTORY      falopean tubes removed  20 yrs ago    [place] SPINE SURGERY PROCEDURE UNLISTED N/A 2018    LUMBAR LAMINECTOMY WITH PSF/PLIF L4-5 WITH CAPTIVA,   DBM LOCAL BONE GRAFTING performed by Apolonia Voss MD at [facility]       Restrictions/Precautions:  General Precautions, Fall Risk                    Spinal Precautions: No Bending, No Lifting, No Twisting  Other position/activity restrictions: up with assist       Prior Level of Function:  ADL Assistance: Needs assistance  Homemaking Assistance:  (pt is responsible for room, parents complete rest)  Ambulation Assistance: Independent  Transfer Assistance: Independent  Additional Comments: Pt uses a yara w/C that her parents push if she goes longer disctanes. She does not use an AD in the house and can walk room to room. 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 pt put call light on and agreeable to OT to assist with getting to bathroom.  pleasant              Pain:      4/10 back pain    Objective              ADL  Toileting: Contact guard assistance          Bed mobility  Supine to Sit: Contact guard assistance  Scooting: Contact guard assistance  Min cues for back precautions with bed mobility          Balance  Sitting Balance: Stand by various functional transfers at [facility] with 0 cues for safety in prep for return to work  Long term goals  Time Frame for Long term goals : NA d/t CÉSAR

## 2018-09-21 NOTE — PROGRESS NOTES
Equipment: Crutches, [employer], 4 wheeled walker (loftstrand crutch)     Objective:       Transfers  Sit to Stand: Contact guard assistance (From bedside chair and from toilet. )  Stand to sit: Contact guard assistance       Ambulation 1  Surface: level tile  Device: Rolling Walker  Assistance: Contact guard assistance  Quality of Gait: Slow samantha. Improved stability. Deminished B heel strike. Cuing to stay closer to AD. Distance: 75 feet x1         Balance  Comments: Static stand at AD while PTA completed dequan clean up. Exercises:  Exercises  Comments: Seated in bedside chair pt completed BLE ankle pumps, LAQ, marches, reclined hip abd/add, heel slides, quad set all x10 reps to increase strength for improved functional mobility. Activity Tolerance:  Activity Tolerance: Patient limited by endurance; Patient Tolerated treatment well    Assessment: Body structures, Functions, Activity limitations: Decreased endurance, Decreased functional mobility , Decreased strength, Decreased balance  Assessment: Pt tolerated session fairly well. Limited by decreased endurance and decreased balance. Home with home health therapy. Prognosis: Good     REQUIRES PT FOLLOW UP: Yes  Discharge Recommendations: Continue to assess pending progress, Patient would benefit from continued therapy after discharge, 24 hour supervision or assist    Patient Education:  Patient Education: POC    Equipment Recommendations:  Equipment Needed: No    Safety:  Type of devices:  All fall risk precautions in place, Call light within reach, Chair alarm in place, Left in chair, Gait belt, Nurse notified  Restraints  Initially in place: No    Plan:  Times per week: 7 X O  Times per day: Daily  Specific instructions for Next Treatment: ther ex-HEP, mobility, gait, balance, endurance   Current Treatment Recommendations: Strengthening, Balance Training, Endurance Training, Functional Mobility Training, Transfer Training, Gait Training,

## 2018-09-21 NOTE — CARE COORDINATION
9/21/18, 10:37 AM    DISCHARGE BARRIERS      Spoke with mom, dad and Dennise Ko about discharge plan. They are in agreement with [facility], in case Dennise Ko goes home with drain. Referral made to [facility], however, they do not accept medicaid for skilled nursing, and cannot accept referral.   Referral made to [facility], who confirmed they can go to patient's address, as it is within their range for services. Referral completed to [facility], and agency will follow. Discussed with parents, who are in agreement. 9/21/18, 12:59 PM    Discharge plan discussed by  and . Discharge plan reviewed with patient/ family. Patient/ family verbalize understanding of discharge plan and are in agreement with plan. Understanding was demonstrated using the teach back method.

## 2018-09-21 NOTE — PLAN OF CARE
Problem: Falls - Risk of:  Goal: Will remain free from falls  Will remain free from falls   Outcome: Ongoing  Pt bed alarm on, mother in room, call light within reach, pt up with staff assistance, no falls this shift      Problem: Pain:  Goal: Pain level will decrease  Pain level will decrease   Outcome: Ongoing  Pt taking pain medication on MAR as needed to control pain. Patient gets extended release oxycodone q 12 hours and has PRN pain medications available     Problem: Cardiovascular  Goal: No DVT, peripheral vascular complications  Outcome: Ongoing  Bilateral scds on pt     Problem: Respiratory  Goal: No pulmonary complications  Outcome: Ongoing  O2 sats greater than 93% on room air, continuous pulse ox on patient     Problem: GI  Goal: No bowel complications  Outcome: Ongoing  Active bowel sounds, no BM yet, abdomen is soft     Problem:   Goal: Adequate urinary output  Outcome: Ongoing  Pt urinating adequately     Problem: Nutrition  Goal: Optimal nutrition therapy  Outcome: Ongoing  Tolerating diet     Problem: Skin Integrity/Risk  Goal: No skin breakdown during hospitalization  Outcome: Ongoing  Dry intact dressing over surgical incision on back with 2 hemovac drains in place     Problem: Safety:  Goal: Free from accidental physical injury  Free from accidental physical injury   Outcome: Ongoing  Bed alarm on, call light within reach     Problem: Discharge Planning:  Goal: Patients continuum of care needs are met  Patients continuum of care needs are met   Outcome: Ongoing  Pt plans to go home with parents at discharge     Care plan reviewed with patient. Patient verbalizes understanding of the plan of care and contribute to goal setting.
contributes to goal setting.
discharge.     Comments: Care plan reviewed with patient/family, verbalized understanding and contributes to goal setting

## 2018-09-22 VITALS
HEART RATE: 108 BPM | HEIGHT: 59 IN | OXYGEN SATURATION: 95 % | SYSTOLIC BLOOD PRESSURE: 120 MMHG | DIASTOLIC BLOOD PRESSURE: 76 MMHG | TEMPERATURE: 98 F | RESPIRATION RATE: 16 BRPM | BODY MASS INDEX: 28.67 KG/M2 | WEIGHT: 142.2 LBS

## 2018-09-22 LAB
BASOPHILS # BLD: 0.4 %
BASOPHILS ABSOLUTE: 0 THOU/MM3 (ref 0–0.1)
EOSINOPHIL # BLD: 3.4 %
EOSINOPHILS ABSOLUTE: 0.4 THOU/MM3 (ref 0–0.4)
ERYTHROCYTE [DISTWIDTH] IN BLOOD BY AUTOMATED COUNT: 13 % (ref 11.5–14.5)
ERYTHROCYTE [DISTWIDTH] IN BLOOD BY AUTOMATED COUNT: 46.4 FL (ref 35–45)
HCT VFR BLD CALC: 30.4 % (ref 37–47)
HEMOGLOBIN: 10.2 GM/DL (ref 12–16)
IMMATURE GRANS (ABS): 0.05 THOU/MM3 (ref 0–0.07)
IMMATURE GRANULOCYTES: 0.4 %
LYMPHOCYTES # BLD: 16.5 %
LYMPHOCYTES ABSOLUTE: 1.9 THOU/MM3 (ref 1–4.8)
MCH RBC QN AUTO: 33.1 PG (ref 26–33)
MCHC RBC AUTO-ENTMCNC: 33.6 GM/DL (ref 32.2–35.5)
MCV RBC AUTO: 98.7 FL (ref 81–99)
MONOCYTES # BLD: 7 %
MONOCYTES ABSOLUTE: 0.8 THOU/MM3 (ref 0.4–1.3)
NUCLEATED RED BLOOD CELLS: 0 /100 WBC
PLATELET # BLD: 309 THOU/MM3 (ref 130–400)
PMV BLD AUTO: 11.8 FL (ref 9.4–12.4)
RBC # BLD: 3.08 MILL/MM3 (ref 4.2–5.4)
SEG NEUTROPHILS: 72.3 %
SEGMENTED NEUTROPHILS ABSOLUTE COUNT: 8.2 THOU/MM3 (ref 1.8–7.7)
WBC # BLD: 11.3 THOU/MM3 (ref 4.8–10.8)

## 2018-09-22 PROCEDURE — 85025 COMPLETE CBC W/AUTO DIFF WBC: CPT

## 2018-09-22 PROCEDURE — 36415 COLL VENOUS BLD VENIPUNCTURE: CPT

## 2018-09-22 PROCEDURE — 97110 THERAPEUTIC EXERCISES: CPT

## 2018-09-22 PROCEDURE — 2580000003 HC RX 258: Performed by: ORTHOPAEDIC SURGERY

## 2018-09-22 PROCEDURE — 6370000000 HC RX 637 (ALT 250 FOR IP): Performed by: ORTHOPAEDIC SURGERY

## 2018-09-22 PROCEDURE — 6370000000 HC RX 637 (ALT 250 FOR IP): Performed by: PHYSICIAN ASSISTANT

## 2018-09-22 PROCEDURE — 6370000000 HC RX 637 (ALT 250 FOR IP): Performed by: INTERNAL MEDICINE

## 2018-09-22 PROCEDURE — 97116 GAIT TRAINING THERAPY: CPT

## 2018-09-22 RX ORDER — HYDROCODONE BITARTRATE AND ACETAMINOPHEN 5; 325 MG/1; MG/1
1 TABLET ORAL EVERY 6 HOURS PRN
Qty: 50 TABLET | Refills: 0 | Status: SHIPPED | OUTPATIENT
Start: 2018-09-22 | End: 2018-10-13

## 2018-09-22 RX ORDER — OXYCODONE HCL 10 MG/1
10 TABLET, FILM COATED, EXTENDED RELEASE ORAL EVERY 12 HOURS
Qty: 20 TABLET | Refills: 0 | Status: SHIPPED | OUTPATIENT
Start: 2018-09-22 | End: 2018-10-02

## 2018-09-22 RX ORDER — CYCLOBENZAPRINE HCL 10 MG
10 TABLET ORAL 2 TIMES DAILY
Qty: 50 TABLET | Refills: 0 | Status: SHIPPED | OUTPATIENT
Start: 2018-09-22 | End: 2018-10-13

## 2018-09-22 RX ADMIN — HYDROCODONE BITARTRATE AND ACETAMINOPHEN 1 TABLET: 5; 325 TABLET ORAL at 06:18

## 2018-09-22 RX ADMIN — MULTIPLE VITAMINS W/ MINERALS TAB 1 TABLET: TAB at 08:36

## 2018-09-22 RX ADMIN — Medication 10 ML: at 08:39

## 2018-09-22 RX ADMIN — METOPROLOL TARTRATE 25 MG: 25 TABLET ORAL at 08:36

## 2018-09-22 RX ADMIN — POLYETHYLENE GLYCOL 3350 17 G: 17 POWDER, FOR SOLUTION ORAL at 08:43

## 2018-09-22 RX ADMIN — Medication 500 MG: at 08:36

## 2018-09-22 RX ADMIN — OXYCODONE HYDROCHLORIDE 10 MG: 10 TABLET, FILM COATED, EXTENDED RELEASE ORAL at 08:36

## 2018-09-22 RX ADMIN — DOCUSATE SODIUM 100 MG: 100 CAPSULE, LIQUID FILLED ORAL at 08:36

## 2018-09-22 RX ADMIN — VITAMIN E CAP 100 UNIT 200 UNITS: 100 CAP at 08:36

## 2018-09-22 RX ADMIN — LEVONORGESTREL AND ETHINYL ESTRADIOL 1 TABLET: KIT at 08:38

## 2018-09-22 ASSESSMENT — PAIN SCALES - GENERAL
PAINLEVEL_OUTOF10: 5
PAINLEVEL_OUTOF10: 6
PAINLEVEL_OUTOF10: 6
PAINLEVEL_OUTOF10: 4
PAINLEVEL_OUTOF10: 4

## 2018-09-22 NOTE — PROGRESS NOTES
Therapy for the upper extremity and lower extremity for muscle strengthening and modality - no physical therapy for the lower back because the patient just had surgery and no one should not manipulate her back. Continue ambulating. Continue having bowel movement. 5:  Check CBC with differential and if hemoglobin is lesser than 8, transfuse 2 units of blood but if the hemoglobin is above 8, then go ahead and remove drain in the lumbar spine and change dressing and continue daily dressing changes and as needed.

## 2018-09-22 NOTE — PROGRESS NOTES
Called [facility] and spoke with Kimmy Ventura to let them know patient is being discharged today.

## 2018-09-22 NOTE — PROGRESS NOTES
Physical Therapy   [address] [address]  INPATIENT PHYSICAL THERAPY  DAILY NOTE  [facility] ORTHOPEDICS 7K - 7K-06/006-A    Time In: 3811  Time Out: 7522  Timed Code Treatment Minutes: 25 Minutes  Minutes: 25          Date: 2018  Patient Name: Noam Marsh,  Gender:  female        MRN: [id number]  : 1980  (45 y.o.)     Referring Practitioner: Dr Jose Ramon Haynes  Diagnosis: Spondylolisthesis  Additional Pertinent Hx: Pt admitted  for decompression and fusion lumbar area. Pt with h/o CP     Past Medical History:   Diagnosis Date    History of cerebral palsy      Past Surgical History:   Procedure Laterality Date    OTHER SURGICAL HISTORY      falopean tubes removed  20 yrs ago    [place] SPINE SURGERY PROCEDURE UNLISTED N/A 2018    LUMBAR LAMINECTOMY WITH PSF/PLIF L4-5 WITH CAPTIVA,   DBM LOCAL BONE GRAFTING performed by Emil James MD at [facility]       Restrictions/Precautions:  General Precautions, Fall Risk     Spinal Precautions: No Bending, No Lifting, No Twisting  Other position/activity restrictions: up with assist       Prior Level of Function:  ADL Assistance: Needs assistance  Homemaking Assistance:  (pt is responsible for room, parents complete rest)  Ambulation Assistance: Independent  Transfer Assistance: Independent  Additional Comments: Pt uses a yara w/C that her parents push if she goes longer disctanes. She does not use an AD in the house and can walk room to room. She used 1 loftstrand/forearm  crutch when she went to work shop    Subjective:  Chart Reviewed: Yes  Response To Previous Treatment: Patient with no complaints from previous session. Family / Caregiver Present: Yes  Subjective: RN approved session, states pt is in bathroom. Pt pleasant and agreeable to therapy. Pain:  Yes.   Pain Assessment  Pain Assessment: 0-10  Pain Level: 4       Social/Functional:  Lives With: Family  Type of Home: House  Home Layout: One level  Home Access: Stairs to enter with rails  Entrance Stairs - Number of Steps: 2 stevo with 1 grab bar  Home Equipment: Cyndee Martinez, 4 wheeled walker (lotiffanietrand crutch)     Objective:  Rolling to Left: Contact guard assistance  Rolling to Right: Contact guard assistance  Sit to Supine: Contact guard assistance (good demo of log roll technique)  Scooting: Contact guard assistance    Transfers  Sit to Stand: Contact guard assistance (from bedside chair, toilet)  Stand to sit: Contact guard assistance       Ambulation 1  Surface: level tile  Device: Rolling Walker  Assistance: Contact guard assistance  Quality of Gait: Slow samantha and velocity. Improved stability. Deminished B heel strike. decreased step length, Cuing to stay closer to AD. Distance: 65ftx1         Exercises:  Exercises  Comments: Seated at EOB pt completed BLE ankle pumps, LAQ, marches, in bed pt completed hip abd/add, heel slides, quad set all x12 reps to increase strength for improved functional mobility. Activity Tolerance:  Activity Tolerance: Patient limited by endurance; Patient Tolerated treatment well    Assessment: Body structures, Functions, Activity limitations: Decreased endurance, Decreased functional mobility , Decreased strength, Decreased balance  Assessment: Pt tolerated session fairly well. Limited by decreased endurance and strength. Home with home health therapy. Prognosis: Good     REQUIRES PT FOLLOW UP: No  Discharge Recommendations: Continue to assess pending progress, Patient would benefit from continued therapy after discharge, 24 hour supervision or assist    Patient Education:  Patient Education: HEP, POC, gait    Equipment Recommendations:  Equipment Needed: No    Safety:  Type of devices:  All fall risk precautions in place, Bed alarm in place, Gait belt, Patient at risk for falls, Left in bed, Nurse notified  Restraints  Initially in place: No    Plan:  Times per week: 7 X O  Times per day: Daily  Specific instructions for Next Treatment: ther

## 2018-09-23 NOTE — DISCHARGE SUMMARY
[address] [place] [address]                                 DISCHARGE SUMMARY    PATIENT NAME: Loren Thompson                     :        1980  MED REC NO:   [id number]                           ROOM:       0006  ACCOUNT NO:   [de-identified]                           ADMIT DATE: 2018  PROVIDER:     Mikhail Pearce PA-C                   [facility] DATE: 2018    DISCHARGE DIAGNOSES:  1.  Grade 1 isthmic spondylolisthesis of the L4 to L5 levels. 2.  Lumbar disk herniation bilateral and lumbar radiculopathy. TREATMENTS:  1. L4-L5 decompressive laminectomy and facetectomy with an L4 to L5  decompression foraminotomy bilateral with an L4 to L5 posterior lumbar  interbody fusion and possible fusion of the L4 to L5 level. 2.  Insertion of one [place] interbody cage of a 10 x 22 mm mesh in place  to the right hand side L4 to L5 annulotomy. 3.  Use of one small kit of the [place] DBM 2 x 5 cm for the fusion of the  L4 to L5 levels. 4.  Instrumentation of lumbar spine levels of L4 to L5 with use of the  [place] pedicle screws and hesham instrumentation system placed bilateral.    HOSPITAL COURSE:  The patient is 45years of age who was suffering from low  back pain, leg weakness, and bilateral leg radiculopathy with difficulty  walking, coming from a grade 1 isthmic spondylolisthesis at the L5 to S1  level and spinal stenosis at the L4 to L5 level due to disk herniation that  is bilateral.  Dr. Magdalena Truong discussed with the patient and her parent the  condition that the patient has, and the patient does have history of  cerebral palsy, and that is why Dr. Magdalena Truong had to talk to both the patient  and the parent when the MRI and the surgical management plan was reviewed  with the patient in the office.   The patient came to the hospital.  IV was put in place and  other medications were given to the patient, and the patient was taken to  the operating room and sedated by anesthesia service. After sedation, Dr. Pola Jorge performed the operation, and after the operation was completed, the  patient was taken to the recovery area and after the recovery area, the  patient came to the [address] [place] [address] floor for further management and mobilization by  physical therapy and occupational therapy. The patient was able to  mobilize with therapy and occupational therapy, and the patient did remain  neurologically intact and was improving day by day. There was no clinical  sign of DVT in the lower extremities bilaterally. The incision site did  not have any infection before the patient was discharged home. There was  drain in the lumbar spine, which was removed and when the drain was  removed, dressing change was complete. After dressing change was complete,  the patient remained neurologically intact. The incision site was intact,  clean, dry, and healing well. The patient was sent home with no drain in  the lumbar spine, but was sent home with home health care for daily  dressing changes as needed and for wound care and as well as for physical  therapy of the upper extremity and lower extremity, but therapy should not  work on the patient's lower back at all. The patient was discharged home  with OxyContin, Norco, and Flexeril for pain management and muscle  relaxation. We will see the patient in the office in two weeks after  discharge. Also, please note that the reason for the therapy for the upper  extremity and the lower extremity is because the patient has history of  cerebral palsy.         Ryan Telles PA-C    D: 09/22/2018 13:06:37       T: 09/22/2018 14:30:09     [email or web address]  Job#: [id number]     Doc#: [id number]    CC:

## 2024-08-03 NOTE — CONSULTS
Consults       Consult Progress      Patient:  Benjamín Thakkar  YOB: 1980    MRN: [id number]     Acct: [de-identified]      Chief Complaint:  tachycardia    Date of Service: Pt seen/examined in consultation on 9.17.2018    History Of Present Illness:      45 y.o. female who we are asked to see/evaluate by Pattie Amaya MD for medical management of evaluation of tachycardia, patient has a hx of CP    Subjective-  Doing okay  Has history of cerebral palsy  Tachycardic when awake    Scheduled Meds:   metoprolol tartrate  25 mg Oral BID    therapeutic multivitamin-minerals  1 tablet Oral Daily    vitamin C  500 mg Oral Daily    vitamin E  200 Units Oral Daily    Levonorgest-Eth Estrad 91-Day  1 tablet Oral Daily    sodium chloride flush  10 mL Intravenous 2 times per day    docusate sodium  100 mg Oral BID    oxyCODONE  10 mg Oral Q12H     Continuous Infusions:   sodium chloride 100 mL/hr at 09/18/18 0451     PRN Meds:.sodium chloride flush, acetaminophen, acetaminophen, cyclobenzaprine, magnesium hydroxide, ondansetron, oxyCODONE-acetaminophen **OR** oxyCODONE-acetaminophen, HYDROmorphone **OR** HYDROmorphone  Diet:  DIET GENERAL;    REVIEW OF SYSTEMS:   Pertinent positives as noted in the HPI. All other systems reviewed and negative. PHYSICAL EXAM:  /65   Pulse 124   Temp 98.7 °F (37.1 °C) (Oral)   Resp 16   Ht 4' 11\" (1.499 m)   Wt 142 lb 3.2 oz (64.5 kg)   LMP 06/25/2018   SpO2 96%   BMI 28.72 kg/m²   General appearance:  No apparent distress, appears stated age and cooperative. HEENT:  Normal cephalic, atraumatic without obvious deformity. Pupils equal, round, and reactive to light. Extra ocular muscles intact. Conjunctivae/corneas clear. Neck: Supple, with full range of motion. no jugular venous distention. Trachea midline. no carotid bruits  Respiratory:  Normal respiratory effort. Clear to auscultation, bilaterally without Rales/Wheezes/Rhonchi.  Breath sounds equal bilaterally  Cardiovascular:  Regular rate and rhythm with normal S1/S2 without murmurs, rubs or gallops. PMI non displaced  Abdomen: Soft, non-tender, non-distended with normal bowel sounds. No guarding, rebound. Musculoskeletal:  No clubbing, cyanosis or edema bilaterally. No palp cords  Skin: Skin color, texture, turgor normal.  No rashes or lesions, or suspicious lesions. Neurologic:   Cranial nerves: II-XII intact, grossly non-focal.  Psychiatric:  Alert and oriented, thought content appropriate, normal insight  Capillary Refill: Brisk,< 2 seconds   Peripheral Pulses: +2 palpable, equal bilaterally upper and lower extremities  Lymphatics: no lymphadenopathy    Labs:     Recent Labs      09/17/18 1926 09/18/18   0548   WBC  18.3*  14.0*   HGB  12.7  10.9*   HCT  38.1  31.4*   PLT  291  252     Recent Labs      09/17/18 1926 09/18/18   0548   NA  135  137   K  4.3  4.4   CL  100  100   CO2  17*  20*   BUN  8  5*   CREATININE  0.5  0.6   CALCIUM  8.2*  8.6     No results for input(s): AST, ALT, BILIDIR, BILITOT, ALKPHOS in the last 72 hours. No results for input(s): INR in the last 72 hours. No results for input(s): Arline Copas in the last 72 hours. Urinalysis:  No results found for: Merlene Ng, BACTERIA, RBCUA, BLOODU, [address], [place] [address]    Radiology: I have reviewed the radiology reports with the following interpretation:     XR LUMBAR SPINE (2-3 VIEWS)   Final Result   1. Slight thoracolumbar dextro scoliosis. Status post posterior lumbar fusion, L4-5. Pelvic screws and rods are present. Description device is seen at the L4-5 level. Lumbar vertebra appear normally aligned. **This report has been created using voice recognition software. It may contain minor errors which are inherent in voice recognition technology. **      Final report electronically signed by Dr. Yves Lopez on 9/18/2018 9:07 AM      XR Lumbar Spine 1 VW   Final Result      Surgical changes as detailed above. Final report electronically signed by Dr. Estiven Em on 9/17/2018 3:36 PM      XR Lumbar Spine 1 VW   Final Result   Intraoperative  appearance of the lumbar spine. **This report has been created using voice recognition software. It may contain minor errors which are inherent in voice recognition technology. **      Final report electronically signed by Dr. Felix Godinez on 9/17/2018 2:32 PM             EKG:  I have reviewed the EKG with the following interpretation:    Pending      DVT prophylaxis: [] Lovenox                                 [x] SCDs                                 [] SQ Heparin                                 [] Encourage ambulation           [] Already on Anticoagulation      Code Status: Full Code    PT/OT Eval Status: Active and ongoing      ASSESSMENT:    C/Gloria Frederick 1106 Problems    Diagnosis Date Noted    Spondylolisthesis of lumbar region [M43.16] 09/17/2018    Tachycardia, unspecified [R00.0] 09/17/2018       PLAN:    Sinus tachycardia likely related to pain versus post surgery- bladder 2-D echo of the heart and add Lopressor with parameters  Status post lumbar laminectomy- per orthopedic surgery      Thank you for the consultation.     Electronically signed by Debi Enciso MD on 9/18/2018 at 3:47 PM [phone]

## (undated) DEVICE — TUBING, SUCTION, 1/4" X 20', STRAIGHT: Brand: MEDLINE INDUSTRIES, INC.

## (undated) DEVICE — SUTURE VCRL SZ 1 L18IN ABSRB VLT CTX L48MM 1/2 CIR J765D

## (undated) DEVICE — GLOVE SURG SZ 9 THK91MIL LTX FREE SYN POLYISOPRENE ANTI

## (undated) DEVICE — GLOVE ORANGE PI 8   MSG9080

## (undated) DEVICE — DRESSING,GAUZE,XEROFORM,CURAD,5"X9",ST: Brand: CURAD

## (undated) DEVICE — SUTURE MCRYL SZ 3-0 L27IN ABSRB UD L24MM PS-1 3/8 CIR PRIM Y936H

## (undated) DEVICE — 1010 S-DRAPE TOWEL DRAPE 10/BX: Brand: STERI-DRAPE™

## (undated) DEVICE — SUTURE ETHLN SZ 3-0 L18IN NONABSORBABLE BLK FS-1 L24MM 3/8 663H

## (undated) DEVICE — SUTURE STRATAFIX SYMMETRIC SZ 1 L18IN ABSRB VLT CT1 L36CM SXPP1A404

## (undated) DEVICE — KIT EVAC 400CC PVC RADPQ Y CONN

## (undated) DEVICE — BUR RND FLUT AGRSV 5MM

## (undated) DEVICE — SUTURE ETHLN SZ 2-0 L30IN NONABSORBABLE BLK L36MM FSLX 3/8 1674H

## (undated) DEVICE — DRAIN SURG 10FR PVC TB W/ TRCR MID PERF NO RESVR HUBLESS

## (undated) DEVICE — GOWN,SIRUS,NON REINFRCD,LARGE,SET IN SL: Brand: MEDLINE

## (undated) DEVICE — BASIC SINGLE BASIN BTC-LF: Brand: MEDLINE INDUSTRIES, INC.

## (undated) DEVICE — E-Z CLEAN, NON-STICK, PTFE COATED, ELECTROSURGICAL BLADE ELECTRODE, 6.5 INCH (16.5 CM): Brand: MEGADYNE

## (undated) DEVICE — PACK PROCEDURE SURG SET UP SRMC

## (undated) DEVICE — PACK,UNIVERSAL,NO GOWNS: Brand: MEDLINE

## (undated) DEVICE — SUTURE NONABSORBABLE MONOFILAMENT 5-0 C-1 1X24 IN PROLENE 8725H

## (undated) DEVICE — SHEET, T, LAPAROTOMY, STERILE: Brand: MEDLINE

## (undated) DEVICE — SPONGE GZ W4XL4IN COT 12 PLY TYP VII WVN C FLD DSGN

## (undated) DEVICE — SPONGE LAP W18XL18IN WHT COT 4 PLY FLD STRUNG RADPQ DISP ST

## (undated) DEVICE — THE MILL DISPOSABLE - MEDIUM

## (undated) DEVICE — UNDERPAD HOSP W30XL36IN WHT SUP ABSRB POLYMER AIR PERM DISP

## (undated) DEVICE — 450 ML BOTTLE OF 0.05% CHLORHEXIDINE GLUCONATE IN 99.95% STERILE WATER FOR IRRIGATION, USP AND APPLICATOR.: Brand: IRRISEPT ANTIMICROBIAL WOUND LAVAGE

## (undated) DEVICE — JCKSON TBL POSTNER NO HD REST: Brand: MEDLINE INDUSTRIES, INC.

## (undated) DEVICE — SUTURE ABSRB BRAID COAT UD CP NO 2 27IN VCRL J195H

## (undated) DEVICE — SUTURE NRLN SZ 4-0 L18IN NONABSORBABLE BLK L13MM TF 1/2 CIR C584D

## (undated) DEVICE — PAD,O.R.,TABLE,CONV FOAM,2X20X72: Brand: MEDLINE

## (undated) DEVICE — YANKAUER,BULB TIP,W/O VENT,RIGID,STERILE: Brand: MEDLINE

## (undated) DEVICE — Device

## (undated) DEVICE — PROBE STIM 3 MM FOR PEDCL SCREW DISP

## (undated) DEVICE — TOTAL TRAY, DB, 100% SILI FOLEY, 16FR 10: Brand: MEDLINE

## (undated) DEVICE — GOWN,SIRUS,NONRNF,SETINSLV,XL,20/CS: Brand: MEDLINE

## (undated) DEVICE — BIPOLAR SEALER 23-112-1 AQM 6.0: Brand: AQUAMANTYS ®

## (undated) DEVICE — KAIRISON TUBING SET PNEUMATIC, (3000 MM), STERILE, DISPOSABLE, TO BE USED WITH: FK898R, PACKAGE OF 10 PIECES: Brand: KAIRISON

## (undated) DEVICE — SUTURE VCRL SZ 2-0 L27IN ABSRB UD L36MM CP-1 1/2 CIR REV J266H

## (undated) DEVICE — CODMAN® SURGICAL PATTIES 1" X 1" (2.54CM X 2.54CM): Brand: CODMAN®